# Patient Record
Sex: MALE | Race: WHITE | NOT HISPANIC OR LATINO | Employment: OTHER | URBAN - METROPOLITAN AREA
[De-identification: names, ages, dates, MRNs, and addresses within clinical notes are randomized per-mention and may not be internally consistent; named-entity substitution may affect disease eponyms.]

---

## 2017-04-12 ENCOUNTER — TELEPHONE (OUTPATIENT)
Dept: CARDIOLOGY | Facility: CLINIC | Age: 72
End: 2017-04-12

## 2017-04-12 NOTE — TELEPHONE ENCOUNTER
Yessenia called to see if pt is able to have an MRI preformed. Pt does have a Pacemaker.  Please advise   Yessenia call back # [phone]   Thanks Jaden wilson

## 2017-05-18 ENCOUNTER — HOSPITAL ENCOUNTER (OUTPATIENT)
Dept: CARDIOLOGY | Facility: HOSPITAL | Age: 72
Discharge: HOME | End: 2017-05-18
Admitting: INTERNAL MEDICINE

## 2017-05-18 ENCOUNTER — TELEPHONE (OUTPATIENT)
Dept: CARDIOLOGY | Facility: CLINIC | Age: 72
End: 2017-05-18

## 2017-05-18 VITALS
WEIGHT: 188 LBS | OXYGEN SATURATION: 98 % | SYSTOLIC BLOOD PRESSURE: 127 MMHG | DIASTOLIC BLOOD PRESSURE: 73 MMHG | BODY MASS INDEX: 26.92 KG/M2 | HEIGHT: 70 IN | RESPIRATION RATE: 16 BRPM | HEART RATE: 60 BPM

## 2017-05-18 DIAGNOSIS — I25.10 CORONARY ARTERY DISEASE INVOLVING NATIVE HEART, ANGINA PRESENCE UNSPECIFIED, UNSPECIFIED VESSEL OR LESION TYPE: ICD-10-CM

## 2017-05-18 DIAGNOSIS — Z95.0 STATUS POST PLACEMENT OF CARDIAC PACEMAKER: ICD-10-CM

## 2017-05-18 DIAGNOSIS — R07.9 CHEST PAIN, UNSPECIFIED TYPE: Primary | ICD-10-CM

## 2017-05-18 DIAGNOSIS — F17.210 CIGARETTE NICOTINE DEPENDENCE WITHOUT COMPLICATION: ICD-10-CM

## 2017-05-18 DIAGNOSIS — I49.5 SSS (SICK SINUS SYNDROME) (HCC): ICD-10-CM

## 2017-05-18 DIAGNOSIS — I10 ESSENTIAL HYPERTENSION: ICD-10-CM

## 2017-05-18 LAB
ALBUMIN SERPL-MCNC: 3.8 G/DL (ref 3.5–5.2)
ALBUMIN/GLOB SERPL: 1.3 G/DL
ALP SERPL-CCNC: 91 U/L (ref 39–117)
ALT SERPL W P-5'-P-CCNC: 6 U/L (ref 1–41)
ANION GAP SERPL CALCULATED.3IONS-SCNC: 12.5 MMOL/L
AST SERPL-CCNC: 19 U/L (ref 1–40)
BASOPHILS # BLD AUTO: 0.03 10*3/MM3 (ref 0–0.2)
BASOPHILS NFR BLD AUTO: 0.5 % (ref 0–1.5)
BILIRUB SERPL-MCNC: 0.3 MG/DL (ref 0.1–1.2)
BNP BLD-MCNC: 64 PG/ML
BUN BLD-MCNC: 16 MG/DL (ref 8–23)
BUN/CREAT SERPL: 11 (ref 7–25)
CALCIUM SPEC-SCNC: 9.3 MG/DL (ref 8.6–10.5)
CHLORIDE SERPL-SCNC: 99 MMOL/L (ref 98–107)
CK MB BLD-MCNC: 1 NG/ML
CO2 SERPL-SCNC: 27.5 MMOL/L (ref 22–29)
CREAT BLD-MCNC: 1.46 MG/DL (ref 0.76–1.27)
DEPRECATED D DIMER PPP-ACNC: 298
DEPRECATED RDW RBC AUTO: 50 FL (ref 37–54)
EOSINOPHIL # BLD AUTO: 0.17 10*3/MM3 (ref 0–0.7)
EOSINOPHIL NFR BLD AUTO: 2.6 % (ref 0.3–6.2)
ERYTHROCYTE [DISTWIDTH] IN BLOOD BY AUTOMATED COUNT: 15.6 % (ref 11.5–14.5)
GFR SERPL CREATININE-BSD FRML MDRD: 48 ML/MIN/1.73
GLOBULIN UR ELPH-MCNC: 3 GM/DL
GLUCOSE BLD-MCNC: 114 MG/DL (ref 65–99)
HCT VFR BLD AUTO: 38.5 % (ref 40.4–52.2)
HGB BLD-MCNC: 12.8 G/DL (ref 13.7–17.6)
IMM GRANULOCYTES # BLD: 0.03 10*3/MM3 (ref 0–0.03)
IMM GRANULOCYTES NFR BLD: 0.5 % (ref 0–0.5)
LYMPHOCYTES # BLD AUTO: 2.46 10*3/MM3 (ref 0.9–4.8)
LYMPHOCYTES NFR BLD AUTO: 37 % (ref 19.6–45.3)
MCH RBC QN AUTO: 28.9 PG (ref 27–32.7)
MCHC RBC AUTO-ENTMCNC: 33.2 G/DL (ref 32.6–36.4)
MCV RBC AUTO: 86.9 FL (ref 79.8–96.2)
MONOCYTES # BLD AUTO: 0.61 10*3/MM3 (ref 0.2–1.2)
MONOCYTES NFR BLD AUTO: 9.2 % (ref 5–12)
NEUTROPHILS # BLD AUTO: 3.35 10*3/MM3 (ref 1.9–8.1)
NEUTROPHILS NFR BLD AUTO: 50.2 % (ref 42.7–76)
PLATELET # BLD AUTO: 218 10*3/MM3 (ref 140–500)
PMV BLD AUTO: 8.8 FL (ref 6–12)
POC MYOGLOBIN: 94.4 NG/ML
POTASSIUM BLD-SCNC: 4.8 MMOL/L (ref 3.5–5.2)
PROT SERPL-MCNC: 6.8 G/DL (ref 6–8.5)
RBC # BLD AUTO: 4.43 10*6/MM3 (ref 4.6–6)
SODIUM BLD-SCNC: 139 MMOL/L (ref 136–145)
TROPONIN I SERPL-MCNC: 0.05 NG/ML (ref 0–0.6)
WBC NRBC COR # BLD: 6.65 10*3/MM3 (ref 4.5–10.7)

## 2017-05-18 PROCEDURE — 84484 ASSAY OF TROPONIN QUANT: CPT

## 2017-05-18 PROCEDURE — 85379 FIBRIN DEGRADATION QUANT: CPT

## 2017-05-18 PROCEDURE — 80053 COMPREHEN METABOLIC PANEL: CPT | Performed by: NURSE PRACTITIONER

## 2017-05-18 PROCEDURE — 36415 COLL VENOUS BLD VENIPUNCTURE: CPT | Performed by: NURSE PRACTITIONER

## 2017-05-18 PROCEDURE — 94760 N-INVAS EAR/PLS OXIMETRY 1: CPT | Performed by: NURSE PRACTITIONER

## 2017-05-18 PROCEDURE — 85025 COMPLETE CBC W/AUTO DIFF WBC: CPT | Performed by: NURSE PRACTITIONER

## 2017-05-18 PROCEDURE — 82553 CREATINE MB FRACTION: CPT

## 2017-05-18 PROCEDURE — 83880 ASSAY OF NATRIURETIC PEPTIDE: CPT

## 2017-05-18 PROCEDURE — 99215 OFFICE O/P EST HI 40 MIN: CPT | Performed by: NURSE PRACTITIONER

## 2017-05-18 PROCEDURE — 83874 ASSAY OF MYOGLOBIN: CPT

## 2017-05-18 RX ORDER — SODIUM CHLORIDE 0.9 % (FLUSH) 0.9 %
10 SYRINGE (ML) INJECTION AS NEEDED
Status: DISCONTINUED | OUTPATIENT
Start: 2017-05-18 | End: 2017-09-12

## 2017-05-18 RX ORDER — FLUOXETINE HYDROCHLORIDE 20 MG/1
20 CAPSULE ORAL NIGHTLY
COMMUNITY

## 2017-05-18 RX ORDER — NITROGLYCERIN 0.4 MG/1
0.4 TABLET SUBLINGUAL
Status: DISCONTINUED | OUTPATIENT
Start: 2017-05-18 | End: 2017-09-12

## 2017-05-18 RX ORDER — SIMVASTATIN 40 MG
40 TABLET ORAL NIGHTLY
COMMUNITY
End: 2017-09-12

## 2017-05-23 ENCOUNTER — HOSPITAL ENCOUNTER (OUTPATIENT)
Dept: CARDIOLOGY | Facility: HOSPITAL | Age: 72
Discharge: HOME | End: 2017-05-23
Admitting: NURSE PRACTITIONER

## 2017-05-23 ENCOUNTER — TELEPHONE (OUTPATIENT)
Dept: CARDIOLOGY | Facility: CLINIC | Age: 72
End: 2017-05-23

## 2017-05-23 DIAGNOSIS — R07.9 CHEST PAIN, UNSPECIFIED TYPE: ICD-10-CM

## 2017-05-23 DIAGNOSIS — I25.10 CORONARY ARTERY DISEASE INVOLVING NATIVE HEART, ANGINA PRESENCE UNSPECIFIED, UNSPECIFIED VESSEL OR LESION TYPE: ICD-10-CM

## 2017-05-23 DIAGNOSIS — I10 ESSENTIAL HYPERTENSION: ICD-10-CM

## 2017-05-23 LAB
BH CV STRESS BP STAGE 1: NORMAL
BH CV STRESS COMMENTS STAGE 1: NORMAL
BH CV STRESS DOSE REGADENOSON STAGE 1: 0.4
BH CV STRESS DURATION MIN STAGE 1: 0
BH CV STRESS DURATION SEC STAGE 1: 15
BH CV STRESS HR STAGE 1: 62
BH CV STRESS PROTOCOL 1: NORMAL
BH CV STRESS RECOVERY BP: NORMAL MMHG
BH CV STRESS RECOVERY HR: 61 BPM
BH CV STRESS STAGE 1: 1
LV EF NUC BP: 61 %
MAXIMAL PREDICTED HEART RATE: 149 BPM
PERCENT MAX PREDICTED HR: 41.61 %
STRESS BASELINE BP: NORMAL MMHG
STRESS BASELINE HR: 60 BPM
STRESS PERCENT HR: 49 %
STRESS POST EXERCISE DUR SEC: 15 SEC
STRESS POST PEAK BP: NORMAL MMHG
STRESS POST PEAK HR: 62 BPM
STRESS TARGET HR: 127 BPM

## 2017-05-23 PROCEDURE — 93017 CV STRESS TEST TRACING ONLY: CPT

## 2017-05-23 PROCEDURE — 0 TECHNETIUM TETROFOSMIN KIT: Performed by: NURSE PRACTITIONER

## 2017-05-23 PROCEDURE — A9502 TC99M TETROFOSMIN: HCPCS | Performed by: NURSE PRACTITIONER

## 2017-05-23 PROCEDURE — 78452 HT MUSCLE IMAGE SPECT MULT: CPT

## 2017-05-23 PROCEDURE — 93018 CV STRESS TEST I&R ONLY: CPT | Performed by: INTERNAL MEDICINE

## 2017-05-23 PROCEDURE — 25010000002 REGADENOSON 0.4 MG/5ML SOLUTION: Performed by: NURSE PRACTITIONER

## 2017-05-23 PROCEDURE — 78452 HT MUSCLE IMAGE SPECT MULT: CPT | Performed by: INTERNAL MEDICINE

## 2017-05-23 PROCEDURE — 93016 CV STRESS TEST SUPVJ ONLY: CPT | Performed by: INTERNAL MEDICINE

## 2017-05-23 RX ADMIN — TETROFOSMIN 1 DOSE: 1.38 INJECTION, POWDER, LYOPHILIZED, FOR SOLUTION INTRAVENOUS at 08:40

## 2017-05-23 RX ADMIN — REGADENOSON 0.4 MG: 0.08 INJECTION, SOLUTION INTRAVENOUS at 08:40

## 2017-05-23 RX ADMIN — TETROFOSMIN 1 DOSE: 1.38 INJECTION, POWDER, LYOPHILIZED, FOR SOLUTION INTRAVENOUS at 07:00

## 2017-05-24 ENCOUNTER — TRANSCRIBE ORDERS (OUTPATIENT)
Dept: ADMINISTRATIVE | Facility: HOSPITAL | Age: 72
End: 2017-05-24

## 2017-05-24 DIAGNOSIS — M54.12 CERVICAL RADICULITIS: Primary | ICD-10-CM

## 2017-05-30 ENCOUNTER — HOSPITAL ENCOUNTER (OUTPATIENT)
Dept: CT IMAGING | Facility: HOSPITAL | Age: 72
Discharge: HOME | End: 2017-05-30
Admitting: INTERNAL MEDICINE

## 2017-05-30 DIAGNOSIS — M54.12 CERVICAL RADICULITIS: ICD-10-CM

## 2017-05-30 PROCEDURE — 72125 CT NECK SPINE W/O DYE: CPT

## 2017-09-05 PROCEDURE — 99283 EMERGENCY DEPT VISIT LOW MDM: CPT

## 2017-09-05 RX ORDER — AMOXICILLIN 250 MG
2 CAPSULE ORAL DAILY
COMMUNITY

## 2017-09-05 RX ORDER — ASPIRIN 81 MG/1
81 TABLET ORAL DAILY
COMMUNITY

## 2017-09-05 RX ORDER — CLOPIDOGREL BISULFATE 75 MG/1
75 TABLET ORAL DAILY
COMMUNITY
Start: 2017-07-29

## 2017-09-05 RX ORDER — HYDROCODONE BITARTRATE AND ACETAMINOPHEN 7.5; 325 MG/1; MG/1
1 TABLET ORAL EVERY 6 HOURS PRN
COMMUNITY
End: 2019-09-10 | Stop reason: ALTCHOICE

## 2017-09-05 RX ORDER — TRAZODONE HYDROCHLORIDE 50 MG/1
25 TABLET ORAL NIGHTLY
COMMUNITY
Start: 2017-07-13

## 2017-09-05 RX ORDER — TAMSULOSIN HYDROCHLORIDE 0.4 MG/1
0.4 CAPSULE ORAL 2 TIMES DAILY
COMMUNITY
Start: 2017-07-13

## 2017-09-06 ENCOUNTER — HOSPITAL ENCOUNTER (EMERGENCY)
Facility: HOSPITAL | Age: 72
Discharge: HOME | End: 2017-09-06
Attending: EMERGENCY MEDICINE | Admitting: EMERGENCY MEDICINE

## 2017-09-06 VITALS
RESPIRATION RATE: 18 BRPM | HEIGHT: 70 IN | HEART RATE: 60 BPM | BODY MASS INDEX: 25.05 KG/M2 | DIASTOLIC BLOOD PRESSURE: 92 MMHG | SYSTOLIC BLOOD PRESSURE: 196 MMHG | WEIGHT: 175 LBS | TEMPERATURE: 98.7 F | OXYGEN SATURATION: 98 %

## 2017-09-06 DIAGNOSIS — I10 ESSENTIAL HYPERTENSION: Primary | ICD-10-CM

## 2017-09-06 RX ORDER — AMLODIPINE BESYLATE 5 MG/1
5 TABLET ORAL DAILY
Qty: 30 TABLET | Refills: 0 | Status: ON HOLD | OUTPATIENT
Start: 2017-09-06 | End: 2018-05-07 | Stop reason: ALTCHOICE

## 2017-09-06 NOTE — ED PROVIDER NOTES
EMERGENCY DEPARTMENT ENCOUNTER    CHIEF COMPLAINT  Chief Complaint: Hypertension  History given by: Patient pt's spouse  History limited by: nothing  Room Number:   PMD: Eilna Tucker MD      HPI:  Pt is a 71 y.o. male with a hx of stroke who presents complaining of hypertension noted at 176/82 at rehab today. Pt states that his BP is usually around 130/70, but has been running higher recently. Pt's spouse states that his BP medications were recently changed by Dr. Tucker (PCP). Pt's spouse states that his pulse usually runs around 60. Pt's spouse denies any recent hypotension. Pt also complains of mild headache and vomiting x1. Pt denies fever, CP, SOA, abdominal pain, diarrhea, and urinary sxs.    Duration/Onset/Timin day  Quality: hypertension  Intensity/Severity: moderate  Associated Symptoms: headache, vomiting  Aggravating or Alleviating Factors: none specified  Previous Episodes: Pt has a hx of hypertension      PAST MEDICAL HISTORY  Active Ambulatory Problems     Diagnosis Date Noted   • Myocardial infarction    • Hypertension    • CAD (coronary artery disease)    • SSS (sick sinus syndrome)      Resolved Ambulatory Problems     Diagnosis Date Noted   • No Resolved Ambulatory Problems     Past Medical History:   Diagnosis Date   • Allergic rhinitis    • Anemia    • Anxiety    • Artery stenosis 03/15/2011   • Artery stenosis 03/15/2011   • Arthritis    • Benign polyp of large intestine    • BPH (benign prostatic hypertrophy)    • Bronchitis 10/30/2013   • CAD (coronary artery disease)    • Carotid artery stenosis    • Cervical stenosis of spinal canal    • CKD (chronic kidney disease) stage 3, GFR 30-59 ml/min    • Colon polyp    • COPD (chronic obstructive pulmonary disease)    • CVA (cerebral infarction) 01/15/2014   • Depression    • Diabetic peripheral neuropathy    • Dizziness    • Fainting    • Fatigue    • Gastritis    • GERD (gastroesophageal reflux disease)    • Headache    • Hearing  loss    • Hematest positive stools 03/15/2012   • History of esophagogastroduodenoscopy 06/29/2011   • Hx of Doppler ultrasound 03/15/2011   • Hyperlipidemia    • Hypertension    • Lumbar canal stenosis    • Muscular aches    • Myocardial infarction    • Nausea 10/30/2013   • Nocturia    • Parkinson's disease    • Peptic ulcer    • Peripheral arterial disease    • Pulmonary nodule    • RLS (restless legs syndrome)    • Shortness of breath    • Skin cancer    • Sleep apnea    • SSS (sick sinus syndrome)    • Stroke 06/21/2017   • Type 2 diabetes mellitus    • Urinary hesitancy    • Vitamin D deficiency        PAST SURGICAL HISTORY  Past Surgical History:   Procedure Laterality Date   • BACK SURGERY      2005, 2008 C-SPINE, L-SPINE   • CATARACT EXTRACTION, BILATERAL  03/01/2009    WITH IOL   • CHOLECYSTECTOMY      2011 DYSKINESIA   • CORONARY ARTERY BYPASS GRAFT     • PACEMAKER IMPLANTATION      PERMANENT   • TEMPORAL ARTERY BIOPSY     • THROMBOENDARTERECTOMY  01/01/2008    CAROTID   • VEIN LIGATION AND STRIPPING     • VITRECTOMY PARS PLANA  09/15/2013    LOCAL BLOCK BY SURGEON LEFT       FAMILY HISTORY  Family History   Problem Relation Age of Onset   • Alzheimer's disease Mother    • Diabetes Mother    • Hypertension Mother    • Hypertension Father    • Heart attack Father    • Heart disease Father    • Hypertension Sister    • Heart disease Sister    • Diabetes Brother    • Heart disease Brother    • Hypertension Brother    • Stroke Brother        SOCIAL HISTORY  Social History     Social History   • Marital status:      Spouse name: N/A   • Number of children: N/A   • Years of education: N/A     Occupational History   • Not on file.     Social History Main Topics   • Smoking status: Former Smoker     Packs/day: 1.00     Types: Cigarettes     Quit date: 6/21/2017   • Smokeless tobacco: Never Used      Comment: Quit when he had a stroke in June 2017   • Alcohol use No   • Drug use: No   • Sexual activity:  Defer     Other Topics Concern   • Not on file     Social History Narrative   • No narrative on file       ALLERGIES  Review of patient's allergies indicates no known allergies.    REVIEW OF SYSTEMS  Review of Systems   Constitutional: Negative.  Negative for chills and fever.   HENT: Negative for sore throat.    Eyes: Negative.    Respiratory: Negative.  Negative for cough.    Cardiovascular: Negative.  Negative for chest pain.        Hypertension   Gastrointestinal: Positive for vomiting.   Genitourinary: Negative.  Negative for dysuria.   Musculoskeletal: Negative.  Negative for back pain.   Skin: Negative.  Negative for rash.   Neurological: Positive for headaches.       PHYSICAL EXAM  ED Triage Vitals   Temp Heart Rate Resp BP SpO2   09/05/17 2205 09/05/17 2203 09/05/17 2203 09/05/17 2241 09/05/17 2203   98.7 °F (37.1 °C) 61 18 210/96 97 %      Temp src Heart Rate Source Patient Position BP Location FiO2 (%)   09/05/17 2205 09/05/17 2203 09/05/17 2241 09/05/17 2241 --   Tympanic Monitor Sitting Right arm        Physical Exam   Constitutional: No distress.   HENT:   Head: Normocephalic and atraumatic.   Mouth/Throat: Oropharynx is clear and moist. Mucous membranes are dry (slightly).   Eyes:   Unremarkable   Cardiovascular: Normal rate and regular rhythm.    No murmur heard.  Pulmonary/Chest: Breath sounds normal. No respiratory distress.   Abdominal: There is no tenderness.   Musculoskeletal: He exhibits no edema or tenderness.   Neurological: He is alert.   Mild chronic R sided weakness   Skin: No rash noted.   Nursing note and vitals reviewed.      PROCEDURES  Procedures      PROGRESS AND CONSULTS  ED Course   0057  Discussed plan to discharge and add Amlodipine for hypertension. Instructed pt to check his BP twice a day for five days and report back to Dr. Tucker (PCP). Pt and family agree to plan and all questions are addressed.        MEDICAL DECISION MAKING  Results were reviewed/discussed with the patient  and they were also made aware of online access. Pt also made aware that some labs, such as cultures, will not be resulted during ER visit and follow up with PMD is necessary.     MDM  Number of Diagnoses or Management Options  Patient Progress  Patient progress: stable         DIAGNOSIS  Final diagnoses:   Essential hypertension       DISPOSITION  DISCHARGE    Patient discharged in stable condition.    Reviewed implications of results, diagnosis, meds, responsibility to follow up, warning signs and symptoms of possible worsening, potential complications and reasons to return to ER.    Patient/Family voiced understanding of above instructions.    Discussed plan for discharge, as there is no emergent indication for admission.  Pt/family is agreeable and understands need for follow up and repeat testing.  Pt is aware that discharge does not mean that nothing is wrong but it indicates no emergency is present that requires admission and they must continue care with follow-up as given below or physician of their choice.     FOLLOW-UP  Elina Tucker MD  [address]  [facility] [address]  [place] [id number]  [phone]      As planned         Medication List      New Prescriptions          amLODIPine 5 MG tablet   Commonly known as:  NORVASC   Take 1 tablet by mouth Daily.         Changed          aspirin 81 MG EC tablet   What changed:  Another medication with the same name was removed. Continue   taking this medication, and follow the directions you see here.         Stop          clonazePAM 0.5 MG tablet   Commonly known as:  KlonoPIN       metFORMIN 500 MG tablet   Commonly known as:  GLUCOPHAGE       simvastatin 40 MG tablet   Commonly known as:  ZOCOR       terazosin 10 MG capsule   Commonly known as:  HYTRIN       trihexyphenidyl 2 MG tablet   Commonly known as:  ARTANE               Latest Documented Vital Signs:  As of 1:02 AM  BP- (!) 196/92 HR- 60 Temp- 98.7 °F (37.1 °C) (Tympanic) O2 sat- 98%    --  Documentation  assistance provided by leticia Russ for Dr. Mosqueda.  Information recorded by the scribe was done at my direction and has been verified and validated by me.       Larissa Russ  09/06/17 0102       Trevin Mosqueda MD  09/06/17 0102

## 2017-09-06 NOTE — ED TRIAGE NOTES
Pt to triage from home. He ambulated to the triage room with steady gait and using his cane. He had outpatient rehab today (Pt had a CVA in June with residual right sided weakness) and his BP was high. He re-checked it tonight and it was even more elevated. Pt is on Metoprolol BID and Lisinopril BID (both doses taken twice today). He reports a slight headache but no other symptoms.

## 2017-09-07 ENCOUNTER — TRANSCRIBE ORDERS (OUTPATIENT)
Dept: ADMINISTRATIVE | Facility: HOSPITAL | Age: 72
End: 2017-09-07

## 2017-09-07 ENCOUNTER — OFFICE VISIT (OUTPATIENT)
Dept: ORTHOPEDIC SURGERY | Facility: CLINIC | Age: 72
End: 2017-09-07

## 2017-09-07 VITALS — TEMPERATURE: 98.5 F | BODY MASS INDEX: 25.62 KG/M2 | HEIGHT: 70 IN | WEIGHT: 179 LBS

## 2017-09-07 DIAGNOSIS — N28.9 RENAL INSUFFICIENCY: ICD-10-CM

## 2017-09-07 DIAGNOSIS — M25.511 CHRONIC RIGHT SHOULDER PAIN: Primary | ICD-10-CM

## 2017-09-07 DIAGNOSIS — I99.8 FLUCTUATING BLOOD PRESSURE: ICD-10-CM

## 2017-09-07 DIAGNOSIS — Z86.73 HISTORY OF STROKE: Primary | ICD-10-CM

## 2017-09-07 DIAGNOSIS — G89.29 CHRONIC RIGHT SHOULDER PAIN: Primary | ICD-10-CM

## 2017-09-07 PROCEDURE — 73030 X-RAY EXAM OF SHOULDER: CPT | Performed by: ORTHOPAEDIC SURGERY

## 2017-09-07 PROCEDURE — 20610 DRAIN/INJ JOINT/BURSA W/O US: CPT | Performed by: ORTHOPAEDIC SURGERY

## 2017-09-07 PROCEDURE — 99204 OFFICE O/P NEW MOD 45 MIN: CPT | Performed by: ORTHOPAEDIC SURGERY

## 2017-09-07 RX ORDER — METHYLPREDNISOLONE ACETATE 80 MG/ML
80 INJECTION, SUSPENSION INTRA-ARTICULAR; INTRALESIONAL; INTRAMUSCULAR; SOFT TISSUE
Status: COMPLETED | OUTPATIENT
Start: 2017-09-07 | End: 2017-09-07

## 2017-09-07 RX ORDER — BUPIVACAINE HYDROCHLORIDE 5 MG/ML
4 INJECTION, SOLUTION EPIDURAL; INTRACAUDAL
Status: COMPLETED | OUTPATIENT
Start: 2017-09-07 | End: 2017-09-07

## 2017-09-07 RX ADMIN — METHYLPREDNISOLONE ACETATE 80 MG: 80 INJECTION, SUSPENSION INTRA-ARTICULAR; INTRALESIONAL; INTRAMUSCULAR; SOFT TISSUE at 13:34

## 2017-09-07 RX ADMIN — BUPIVACAINE HYDROCHLORIDE 4 ML: 5 INJECTION, SOLUTION EPIDURAL; INTRACAUDAL at 13:34

## 2017-09-07 NOTE — PROGRESS NOTES
New Shoulder      Patient: Houston Armendariz        YOB: 1945    Medical Record Number: [id number]        Chief Complaints: SHOULDER PAIN right      History of Present Illness: This is a  71 y.o. male who presents with right shoulder pain.  He is right-hand-dominant is been ongoing since his stroke in the middle of the summer.  He did have falls before and after stroke more and states he had some injury to her shoulder.  He has seen a pain doctor in the past who injected his shoulder and his neck.  He states his shoulders quite limiting to his rehabilitation giving rise to significant night pain.  His   Symptoms are moderate to severe constant stabbing he is retired his past medical history is mild for diabetes sleep apnea stroke COPD cardiac disease and kidney disease and depression anxiety        Allergies: No Known Allergies    Medications:   Home Medications:  Current Outpatient Prescriptions on File Prior to Visit   Medication Sig   • amLODIPine (NORVASC) 5 MG tablet Take 1 tablet by mouth Daily.   • aspirin 325 MG tablet Take 325 mg by mouth daily.   • aspirin 81 MG EC tablet Take 81 mg by mouth Daily.   • budesonide-formoterol (SYMBICORT) 80-4.5 MCG/ACT inhaler Inhale 2 puffs 2 (two) times a day. RINSE MOUTH AFTER USE   • carbidopa-levodopa (SINEMET)  MG per tablet Take 2 tablets by mouth 3 (three) times a day.   • clonazePAM (KlonoPIN) 0.5 MG tablet Take 0.5 mg by mouth 3 (Three) Times a Day.   • clopidogrel (PLAVIX) 75 MG tablet Take 75 mg by mouth Daily.   • cyanocobalamin (VITAMIN B-12) 1000 MCG tablet Take 100 mcg by mouth Daily.   • FLUoxetine (PROzac) 20 MG capsule Take 20 mg by mouth Every Night.   • FLUoxetine (PROzac) 40 MG capsule Take 40 mg by mouth Every Morning.   • fluticasone (VERAMYST) 27.5 MCG/SPRAY nasal spray 2 sprays into each nostril daily.   • gabapentin (NEURONTIN) 600 MG tablet Take 600 mg by mouth. 300 mg in the morning, 1200mg tablets nightly.   •  HYDROcodone-acetaminophen (NORCO) 7.5-325 MG per tablet Take 1 tablet by mouth.   • lisinopril (PRINIVIL,ZESTRIL) 40 MG tablet TAKE ONE TABLET BY MOUTH TWICE A DAY   • memantine (NAMENDA XR) 28 MG capsule sustained-release 24 hr extended release capsule Take 28 mg by mouth daily.   • metFORMIN (GLUCOPHAGE) 500 MG tablet Take 500 mg by mouth 2 (two) times a day with meals.   • metoprolol tartrate (LOPRESSOR) 25 MG tablet Take 25 mg by mouth 2 (two) times a day.   • nitroglycerin (NITROSTAT) 0.4 MG SL tablet Place 0.4 mg under the tongue every 5 (five) minutes as needed for chest pain. Take no more than 3 doses in 15 minutes. IF PAIN REMAINS AFTER 5 MINUTES, CALL 911 AND REPEAT DOSE   • Pantoprazole Sodium 40 MG pack Take 40 mg by mouth 2 (two) times a day.   • POLYETHYLENE GLYCOL EX Daily.   • rivastigmine (EXELON) 4.5 MG capsule Take 4.5 mg by mouth 2 (two) times a day.   • senna-docusate (PERICOLACE) 8.6-50 MG per tablet Take 2 tablets by mouth Daily.   • simvastatin (ZOCOR) 40 MG tablet Take 40 mg by mouth Every Night.   • tamsulosin (FLOMAX) 0.4 MG capsule 24 hr capsule Take 0.4 mg by mouth Every Night.   • terazosin (HYTRIN) 10 MG capsule Take 10 mg by mouth.   • traZODone (DESYREL) 50 MG tablet Take 25 mg by mouth Every Night.   • trihexyphenidyl (ARTANE) 2 MG tablet Take 2 mg by mouth 2 (Two) Times a Day With Meals. TAKE ONE TABLET DAILY AS DIRECTED      Current Facility-Administered Medications on File Prior to Visit   Medication   • nitroglycerin (NITROSTAT) SL tablet 0.4 mg   • sodium chloride 0.9 % flush 10 mL     Current Medications:  Scheduled Meds:   Continuous Infusions:   PRN Meds:.•  nitroglycerin  •  sodium chloride    Past Medical History:   Diagnosis Date   • Allergic rhinitis    • Anemia    • Anxiety    • Artery stenosis 03/15/2011    INFERIOR MESENTERIC   • Artery stenosis 03/15/2011    SUPERIOR MESENTERIC    • Arthritis    • Benign polyp of large intestine    • BPH (benign prostatic  hypertrophy)     Dr. Peter.   • Bronchitis 10/30/2013   • CAD (coronary artery disease)     prior MI, status post 3V CABG 2008   • Carotid artery stenosis     Hx of left CEA in 2007   • Cervical stenosis of spinal canal     DDD and OA   • CKD (chronic kidney disease) stage 3, GFR 30-59 ml/min    • Colon polyp    • COPD (chronic obstructive pulmonary disease)    • CVA (cerebral infarction) 01/15/2014    OLD LEFT SUPERIOR PARIETAL INFARCT PER CT OF BRAIN   • Depression    • Diabetic peripheral neuropathy    • Dizziness    • Fainting    • Fatigue    • Gastritis    • GERD (gastroesophageal reflux disease)    • Headache    • Hearing loss     left   • Hematest positive stools 03/15/2012    NEGATIVE X 3   • History of esophagogastroduodenoscopy 06/29/2011   • Hx of Doppler ultrasound 03/15/2011    ARTERY CAROTID 3-2011 <50%stenosis brielle hx of left cea.....09-11 <50%STENOSIS ON RIGHT, NORMAL APPEARINT LEFT S/P ENDARTERECTOMY   • Hyperlipidemia    • Hypertension    • Lumbar canal stenosis     due to DDD and OA   • Muscular aches    • Myocardial infarction    • Nausea 10/30/2013   • Nocturia    • Parkinson's disease    • Peptic ulcer    • Peripheral arterial disease    • Pulmonary nodule     12/13 CT with RUL (new) and smaller RLL nodules (followed by Dr Reynoso), repeat CT 6 months                                    6/13 4mm RLL pulmonary nodule per CT, recheck 6 months   • RLS (restless legs syndrome)    • Shortness of breath    • Skin cancer    • Sleep apnea     on BIPAP   • SSS (sick sinus syndrome)    • Stroke 06/21/2017   • Type 2 diabetes mellitus    • Urinary hesitancy    • Vitamin D deficiency         Past Surgical History:   Procedure Laterality Date   • BACK SURGERY      2005, 2008 C-SPINE, L-SPINE   • CATARACT EXTRACTION, BILATERAL  03/01/2009    WITH IOL   • CHOLECYSTECTOMY      2011 DYSKINESIA   • CORONARY ARTERY BYPASS GRAFT     • PACEMAKER IMPLANTATION      PERMANENT   • TEMPORAL ARTERY BIOPSY     •  THROMBOENDARTERECTOMY  01/01/2008    CAROTID   • VEIN LIGATION AND STRIPPING     • VITRECTOMY PARS PLANA  09/15/2013    LOCAL BLOCK BY SURGEON LEFT        Social History     Occupational History   • Not on file.     Social History Main Topics   • Smoking status: Former Smoker     Packs/day: 1.00     Types: Cigarettes     Quit date: 6/21/2017   • Smokeless tobacco: Never Used      Comment: Quit when he had a stroke in June 2017   • Alcohol use No   • Drug use: No   • Sexual activity: Defer      Social History     Social History Narrative   • No narrative on file        Family History   Problem Relation Age of Onset   • Alzheimer's disease Mother    • Diabetes Mother    • Hypertension Mother    • Hypertension Father    • Heart attack Father    • Heart disease Father    • Hypertension Sister    • Heart disease Sister    • Diabetes Brother    • Heart disease Brother    • Hypertension Brother    • Stroke Brother              Review of Systems: 14 point review of systems are remarkable for the pertinent positives listed in the chart by the patient the remainder are negative    Review of Systems      Physical Exam: 71 y.o. male  General Appearance:    Alert, cooperative, in no acute distress                 There were no vitals filed for this visit.   Patient is alert and read ×3 no acute distress appears her above-listed at height weight and age.  Affect is normal respiratory rate is normal unlabored. Heart rate regular rate rhythm, sclera, dentition and hearing are normal for the purpose of this exam.    Ortho Exam Physical exam of the right shoulder reveals no overlying skin changes no lymphedema no lymphadenopathy.  Patient has active flexion 180 and significant limping with mild symptoms abduction is similar external rotation is to 40 and internal rotation to the upper lumbar spine with mild symptoms.  Patient has good rotator cuff strength 4 over 5 with isometric strength testing with pain.  Patient has a positive  impingement and a positive Ferrer sign.  Patient has good cervical range of motion which is full and asymptomatic no radicular symptoms.  Patient has a normal elbow exam.  Good distal pulses are present he does have some stiffness and is hand and his fingers  Patient has pain with overhead activity and a positive Neer sign and a positive empty can sign    Large Joint Arthrocentesis  Date/Time: 9/7/2017 1:34 PM  Consent given by: patient  Site marked: site marked  Timeout: Immediately prior to procedure a time out was called to verify the correct patient, procedure, equipment, support staff and site/side marked as required   Supporting Documentation  Indications: pain   Procedure Details  Location: shoulder - R subacromial bursa  Preparation: Patient was prepped and draped in the usual sterile fashion  Needle size: 25 G  Approach: posterior  Medications administered: 80 mg methylPREDNISolone acetate 80 MG/ML; 4 mL bupivacaine (PF) 0.5 %  Patient tolerance: patient tolerated the procedure well with no immediate complications                Radiology:   AP, Scapular Y and Axillary Lateral of the right shoulder were ordered/reviewed to evauate shoulder pain.  I've no comparative films he has some mild degenerative changes at the glenohumeral joint as best seen on the glenoid side on the A-P no acute bony pathology is seen    Assessment/Plan:  Right shoulder pain following several falls and a CVA he most likely has an underlying rotator cuff tear.  My goal is to get him some relief so shoulder does not inhibit his rehabilitation.  I would like to inject him today he was agreeable to do this we could repeat it in 3 months if it gives him relief.  Hopefully this give him relief especially from his night pain

## 2017-09-11 ENCOUNTER — HOSPITAL ENCOUNTER (OUTPATIENT)
Dept: CARDIOLOGY | Facility: HOSPITAL | Age: 72
Discharge: HOME | End: 2017-09-11
Admitting: INTERNAL MEDICINE

## 2017-09-11 DIAGNOSIS — N28.9 RENAL INSUFFICIENCY: ICD-10-CM

## 2017-09-11 DIAGNOSIS — I99.8 FLUCTUATING BLOOD PRESSURE: ICD-10-CM

## 2017-09-11 DIAGNOSIS — Z86.73 HISTORY OF STROKE: ICD-10-CM

## 2017-09-11 LAB
BH CV ECHO MEAS - DIST REN A EDV LEFT: 21.1 CM/SEC
BH CV ECHO MEAS - DIST REN A PSV LEFT: 72 CM/SEC
BH CV ECHO MEAS - DIST REN A RI LEFT: 0.71
BH CV ECHO MEAS - HILAR A EDV LEFT: 10.8 CM/SEC
BH CV ECHO MEAS - HILAR A PSV LEFT: 50.2 CM/SEC
BH CV ECHO MEAS - HILAR A RI LEFT: 0.78
BH CV ECHO MEAS - MID REN A EDV LEFT: 18 CM/SEC
BH CV ECHO MEAS - MID REN A PSV LEFT: 90 CM/SEC
BH CV ECHO MEAS - MID REN A RI LEFT: 0.8
BH CV ECHO MEAS - PROX REN A EDV LEFT: 17.4 CM/SEC
BH CV ECHO MEAS - PROX REN A PSV LEFT: 87.1 CM/SEC
BH CV ECHO MEAS - PROX REN A RI LEFT: 0.8
BH CV VAS BP LEFT ARM: NORMAL MMHG
BH CV VAS BP RIGHT ARM: NORMAL MMHG
BH CV VAS RENAL AORTIC MID PSV: 50 CM/S
BH CV VAS RENAL HILUM LEFT EDV: 10.8 CM/S
BH CV VAS RENAL HILUM LEFT PSV: 50.2 CM/S
BH CV VAS RENAL HILUM RIGHT EDV: 11.8 CM/S
BH CV VAS RENAL HILUM RIGHT PSV: 38.4 CM/S
BH CV XLRA MEAS - KID L LEFT: 10.5 CM
BH CV XLRA MEAS - RENAL A ORG RI LEFT: 0.8
BH CV XLRA MEAS DIST REN A EDV RIGHT: 15.7 CM/SEC
BH CV XLRA MEAS DIST REN A PSV RIGHT: 77.8 CM/SEC
BH CV XLRA MEAS DIST REN A RI RIGHT: 0.8
BH CV XLRA MEAS HILAR A EDV RIGHT: 11.8 CM/SEC
BH CV XLRA MEAS HILAR A PSV RIGHT: 38.4 CM/SEC
BH CV XLRA MEAS HILAR A RI RIGHT: 0.69
BH CV XLRA MEAS KID L RIGHT: 10.3 CM
BH CV XLRA MEAS KID W RIGHT: 5.11 CM
BH CV XLRA MEAS MID REN A EDV RIGHT: -17.3 CM/SEC
BH CV XLRA MEAS MID REN A PSV RIGHT: -90.4 CM/SEC
BH CV XLRA MEAS MID REN A RI RIGHT: 0.81
BH CV XLRA MEAS PROX REN A EDV RIGHT: 24.6 CM/SEC
BH CV XLRA MEAS PROX REN A PSV RIGHT: 100 CM/SEC
BH CV XLRA MEAS PROX REN A RI RIGHT: 0.75
BH CV XLRA MEAS RAR LEFT: 1.8
BH CV XLRA MEAS RAR RIGHT: 2
BH CV XLRA MEAS RENAL A ORG EDV LEFT: 17 CM/SEC
BH CV XLRA MEAS RENAL A ORG EDV RIGHT: 16.7 CM/SEC
BH CV XLRA MEAS RENAL A ORG PSV LEFT: 83 CM/SEC
BH CV XLRA MEAS RENAL A ORG PSV RIGHT: 70.9 CM/SEC
BH CV XLRA MEAS RENAL A ORG RI RIGHT: 0.76
LEFT KIDNEY WIDTH: 10.77 CM
LEFT RENAL UPPER PARENCHYMA MAX: 37.8 CM/S
LEFT RENAL UPPER PARENCHYMA MIN: 8.3 CM/S
LEFT RENAL UPPER PARENCHYMA RI: 0.78
RIGHT RENAL UPPER PARENCHYMA MAX: 29.7 CM/S
RIGHT RENAL UPPER PARENCHYMA MIN: 6.8 CM/S
RIGHT RENAL UPPER PARENCHYMA RI: 0.77

## 2017-09-11 PROCEDURE — 93975 VASCULAR STUDY: CPT

## 2017-09-20 ENCOUNTER — OFFICE VISIT (OUTPATIENT)
Dept: CARDIOLOGY | Facility: CLINIC | Age: 72
End: 2017-09-20

## 2017-09-20 ENCOUNTER — CLINICAL SUPPORT NO REQUIREMENTS (OUTPATIENT)
Dept: CARDIOLOGY | Facility: CLINIC | Age: 72
End: 2017-09-20

## 2017-09-20 VITALS
DIASTOLIC BLOOD PRESSURE: 70 MMHG | SYSTOLIC BLOOD PRESSURE: 138 MMHG | BODY MASS INDEX: 25.62 KG/M2 | HEIGHT: 70 IN | WEIGHT: 179 LBS | HEART RATE: 63 BPM

## 2017-09-20 DIAGNOSIS — R01.1 MURMUR: ICD-10-CM

## 2017-09-20 DIAGNOSIS — I10 ESSENTIAL HYPERTENSION: Primary | ICD-10-CM

## 2017-09-20 DIAGNOSIS — I49.5 SICK SINUS SYNDROME (HCC): Primary | ICD-10-CM

## 2017-09-20 DIAGNOSIS — I63.9 CEREBROVASCULAR ACCIDENT (CVA), UNSPECIFIED MECHANISM (HCC): ICD-10-CM

## 2017-09-20 DIAGNOSIS — I49.5 SSS (SICK SINUS SYNDROME) (HCC): ICD-10-CM

## 2017-09-20 DIAGNOSIS — I25.10 CORONARY ARTERY DISEASE INVOLVING NATIVE CORONARY ARTERY OF NATIVE HEART WITHOUT ANGINA PECTORIS: ICD-10-CM

## 2017-09-20 PROCEDURE — 93000 ELECTROCARDIOGRAM COMPLETE: CPT | Performed by: NURSE PRACTITIONER

## 2017-09-20 PROCEDURE — 93280 PM DEVICE PROGR EVAL DUAL: CPT | Performed by: INTERNAL MEDICINE

## 2017-09-20 PROCEDURE — 99214 OFFICE O/P EST MOD 30 MIN: CPT | Performed by: NURSE PRACTITIONER

## 2017-09-20 RX ORDER — METOPROLOL TARTRATE 50 MG/1
50 TABLET, FILM COATED ORAL 2 TIMES DAILY
Qty: 60 TABLET | Refills: 3 | Status: SHIPPED | OUTPATIENT
Start: 2017-09-20 | End: 2018-01-23 | Stop reason: SDUPTHER

## 2017-09-20 RX ORDER — LISINOPRIL 10 MG/1
10 TABLET ORAL 2 TIMES DAILY
COMMUNITY
End: 2020-10-29 | Stop reason: ALTCHOICE

## 2017-09-20 NOTE — PROGRESS NOTES
Date of Office Visit: 2017  Encounter Provider: IRENA Mayorga  Place of Service: [facility] CARDIOLOGY  Patient Name: Houston Armendariz  :1945    Chief Complaint   Patient presents with   • Hypertension   :     HPI: Houston Armendariz is a 72 y.o. male comes in today for hypertension and a recent stroke.  He is a patient of Dr. Juan.  I'm seeing him for the first time today and have reviewed his records.  He has a history of coronary disease status post coronary artery bypass graft, hypertension, sick sinus syndrome with pacemaker placement, sleep apnea, COPD , diabetes and CVA.    May 2016, patient had a myocardial perfusion study showing no evidence of ischemia and EF of 56%.    May 2017, patient was in his primary care office complaining of chest pain.  He took 3 nitroglycerin which relieved his pain.  He was sent to the chest pain unit and seen by IRENA Gonzalez.  He ruled out for MI.  Set up for repeat stress study showing no evidence of ischemia.  He was to see Dr. Juan in 2017 but did not show up for his appointment.    On 2017, the patient went to the emergency room complaining of hypertension of 176/82 at rehabilitation.  He noted his blood pressure was typically 130/70.  His blood pressure medicine and recently been changed by his primary care physician.  Amlodipine was added to his medications for hypertension.    Today the patient comes in because of his blood pressure. He states he was in the hospital in 2017 with a stroke at [facility].   He also went to [facility]. He was going to physical therapy as an outpatient and the blood pressure was getting elevated at 170s/90s. They were concerned about continuing physical therapy and he had to stop; that is why he went to the emergency room. He reports today that his blood pressure has been better controlled over the past couple of days, but it has taken a while since being on  amlodipine. He denies any other symptoms.  He denies palpitations or tachycardia, shortness of breath, edema, dizziness, chest pain, orthopnea or PND. He is following up with a vascular physician due to his stroke and a history of carotid endarterectomy. They are re-ultrasounding his carotid arteries.           Past Medical History:   Diagnosis Date   • Allergic rhinitis    • Anemia    • Anxiety    • Artery stenosis 03/15/2011    INFERIOR MESENTERIC   • Artery stenosis 03/15/2011    SUPERIOR MESENTERIC    • Arthritis    • Benign polyp of large intestine    • BPH (benign prostatic hypertrophy)     Dr. Peter.   • Bronchitis 10/30/2013   • CAD (coronary artery disease)     prior MI, status post 3V CABG 2008   • Carotid artery stenosis     Hx of left CEA in 2007   • Cervical stenosis of spinal canal     DDD and OA   • CKD (chronic kidney disease) stage 3, GFR 30-59 ml/min    • Colon polyp    • COPD (chronic obstructive pulmonary disease)    • CVA (cerebral infarction) 01/15/2014    OLD LEFT SUPERIOR PARIETAL INFARCT PER CT OF BRAIN   • Depression    • Diabetic peripheral neuropathy    • Dizziness    • Fainting    • Fatigue    • Gastritis    • GERD (gastroesophageal reflux disease)    • Headache    • Hearing loss     left   • Hematest positive stools 03/15/2012    NEGATIVE X 3   • History of esophagogastroduodenoscopy 06/29/2011   • Hx of Doppler ultrasound 03/15/2011    ARTERY CAROTID 3-2011 <50%stenosis brielle hx of left cea.....09-11 <50%STENOSIS ON RIGHT, NORMAL APPEARINT LEFT S/P ENDARTERECTOMY   • Hyperlipidemia    • Hypertension    • Lumbar canal stenosis     due to DDD and OA   • Muscular aches    • Myocardial infarction    • Nausea 10/30/2013   • Nocturia    • Parkinson's disease    • Peptic ulcer    • Peripheral arterial disease    • Pulmonary nodule     12/13 CT with RUL (new) and smaller RLL nodules (followed by Dr Reynoso), repeat CT 6 months                                    6/13 4mm RLL pulmonary nodule per  CT, recheck 6 months   • RLS (restless legs syndrome)    • Shortness of breath    • Skin cancer    • Sleep apnea     on BIPAP   • SSS (sick sinus syndrome)    • Stroke 06/21/2017   • Type 2 diabetes mellitus    • Urinary hesitancy    • Vitamin D deficiency        Past Surgical History:   Procedure Laterality Date   • BACK SURGERY      2005, 2008 C-SPINE, L-SPINE   • CATARACT EXTRACTION, BILATERAL  03/01/2009    WITH IOL   • CHOLECYSTECTOMY      2011 DYSKINESIA   • CORONARY ARTERY BYPASS GRAFT     • PACEMAKER IMPLANTATION      PERMANENT   • TEMPORAL ARTERY BIOPSY     • THROMBOENDARTERECTOMY  01/01/2008    CAROTID   • VEIN LIGATION AND STRIPPING     • VITRECTOMY PARS PLANA  09/15/2013    LOCAL BLOCK BY SURGEON LEFT           Review of Systems   Constitution: Negative for fever and malaise/fatigue.   HENT: Negative for ear pain, hearing loss, nosebleeds and sore throat.    Eyes: Negative for double vision, pain, vision loss in left eye, vision loss in right eye and visual disturbance.   Cardiovascular: Negative for claudication and leg swelling.   Respiratory: Negative for cough, snoring and wheezing.    Endocrine: Negative for cold intolerance, heat intolerance and polyuria.   Skin: Negative for color change, itching and rash.   Musculoskeletal: Negative for joint pain, joint swelling and muscle cramps.   Gastrointestinal: Negative for abdominal pain, diarrhea, melena, nausea and vomiting.   Genitourinary: Negative for bladder incontinence and hematuria.   Neurological: Negative for excessive daytime sleepiness, dizziness, light-headedness, paresthesias and seizures.   Psychiatric/Behavioral: Negative for depression. The patient is not nervous/anxious.    All other systems reviewed and are negative.    All other systems reviewed and are negative    No Known Allergies    All aspects of family and social history reviewed.          Objective:     Vitals:    09/20/17 0820   BP: 138/70   BP Location: Left arm   Pulse: 63  "  Weight: 179 lb (81.2 kg)   Height: 70\" (177.8 cm)     Body mass index is 25.68 kg/(m^2).    PHYSICAL EXAM:  Physical Exam   Constitutional: He is oriented to person, place, and time. He appears well-developed and well-nourished.   HENT:   Head: Normocephalic and atraumatic.   Neck: Neck supple. No JVD present.   Cardiovascular: Normal rate, regular rhythm and intact distal pulses.    Murmur heard.   Harsh midsystolic murmur is present with a grade of 2/6  at the upper right sternal border radiating to the neck  Pulses:       Carotid pulses are 2+ on the right side, and 2+ on the left side.       Radial pulses are 2+ on the right side, and 2+ on the left side.        Dorsalis pedis pulses are 2+ on the right side, and 2+ on the left side.   Pulmonary/Chest: Effort normal and breath sounds normal. No accessory muscle usage. No respiratory distress. He has no rales.   Abdominal: Soft. Normal appearance and bowel sounds are normal. There is no tenderness.   Musculoskeletal: Normal range of motion. He exhibits no edema.   Neurological: He is alert and oriented to person, place, and time.   Skin: Skin is warm, dry and intact. He is not diaphoretic.   Psychiatric: He has a normal mood and affect. His speech is normal and behavior is normal. Judgment and thought content normal. Cognition and memory are normal.         ECG 12 Lead  Date/Time: 9/20/2017 8:56 AM  Performed by: JINNY JAMIL  Authorized by: JINNY JAMIL   Comparison: compared with previous ECG   Rhythm: sinus rhythm  Rate: normal  BPM: 61  Conduction: conduction normal  ST Segments: ST segments normal  T Waves: T waves normal  QRS axis: normal  Other: no other findings  Clinical impression: normal ECG  Comments: Indication: SSS, HTN                Assessment:       Diagnosis Plan   1. Essential hypertension  ECG 12 Lead   2. Coronary artery disease involving native coronary artery of native heart without angina pectoris  ECG 12 Lead   3. SSS (sick " sinus syndrome)  ECG 12 Lead   4. Murmur  ECG 12 Lead   5. Cerebrovascular accident (CVA), unspecified mechanism          Orders Placed This Encounter   Procedures   • ECG 12 Lead     This order was created via procedure documentation       Current Outpatient Prescriptions   Medication Sig Dispense Refill   • amLODIPine (NORVASC) 5 MG tablet Take 1 tablet by mouth Daily. 30 tablet 0   • aspirin 81 MG EC tablet Take 81 mg by mouth Daily.     • budesonide-formoterol (SYMBICORT) 80-4.5 MCG/ACT inhaler Inhale 2 puffs 2 (two) times a day. RINSE MOUTH AFTER USE     • carbidopa-levodopa (SINEMET)  MG per tablet Take 2 tablets by mouth 3 (three) times a day.     • clopidogrel (PLAVIX) 75 MG tablet Take 75 mg by mouth Daily.     • cyanocobalamin (VITAMIN B-12) 1000 MCG tablet Take 100 mcg by mouth Daily.     • FLUoxetine (PROzac) 20 MG capsule Take 20 mg by mouth Every Night.     • FLUoxetine (PROzac) 40 MG capsule Take 40 mg by mouth Every Morning.     • fluticasone (VERAMYST) 27.5 MCG/SPRAY nasal spray 2 sprays into each nostril daily.     • gabapentin (NEURONTIN) 600 MG tablet Take 600 mg by mouth. 300 mg in the morning, 1200mg tablets nightly.     • HYDROcodone-acetaminophen (NORCO) 7.5-325 MG per tablet Take 1 tablet by mouth.     • lisinopril (PRINIVIL,ZESTRIL) 10 MG tablet Take 10 mg by mouth 2 (Two) Times a Day.     • memantine (NAMENDA XR) 28 MG capsule sustained-release 24 hr extended release capsule Take 28 mg by mouth daily.     • metoprolol tartrate (LOPRESSOR) 50 MG tablet Take 1 tablet by mouth 2 (Two) Times a Day. 60 tablet 3   • nitroglycerin (NITROSTAT) 0.4 MG SL tablet Place 0.4 mg under the tongue every 5 (five) minutes as needed for chest pain. Take no more than 3 doses in 15 minutes. IF PAIN REMAINS AFTER 5 MINUTES, CALL 911 AND REPEAT DOSE     • Pantoprazole Sodium 40 MG pack Take 40 mg by mouth 2 (two) times a day.     • POLYETHYLENE GLYCOL EX Daily.     • rivastigmine (EXELON) 4.5 MG capsule  Take 4.5 mg by mouth 2 (two) times a day.     • senna-docusate (PERICOLACE) 8.6-50 MG per tablet Take 2 tablets by mouth Daily.     • tamsulosin (FLOMAX) 0.4 MG capsule 24 hr capsule Take 0.4 mg by mouth Every Night.     • traZODone (DESYREL) 50 MG tablet Take 25 mg by mouth Every Night.       No current facility-administered medications for this visit.             Plan:         1. Hypertension.  The patient is having issues with his blood pressure.  It is better since adding amlodipine 5 mg daily.  His blood pressure was still elevated yesterday.  Creatinine was slightly elevated in 05/2017.  I will not increase his lisinopril.  We will increase metoprolol to 50 mg twice a day.  The patient has a pacemaker in place.    2. Coronary disease.  History of coronary disease status post coronary artery bypass grafting without angina.    3. Sick sinus syndrome with pacemaker in place.  He has not had the pacemaker checked in over a year.  We will get this checked today.    4. Murmur.  No history of a documented murmur.  I hear a grade 2 systolic murmur over the aortic valve listening area.  We will review records from [facility] for an echocardiogram.  If no echocardiogram was performed, we will order.    5. Stroke.  History of a stroke with a repeat in 06/2017.  This was at [facility] with rehabilitation at [facility].  He is currently on aspirin and Plavix.  He sees vascular.  Per the patient’s report, this seems like an atherosclerotic stroke.  He has no history of atrial fibrillation.      We will get records to review.  Follow up with primary care physician in about one month for check of blood pressure.  Follow up with Dr. Juan in three months.            Follow up in office in 3 months with Dr. Juan    As always, it has been a pleasure to participate in this patient's care.      Sincerely,      IRENA Mayorga

## 2017-09-21 ENCOUNTER — DOCUMENTATION (OUTPATIENT)
Dept: CARDIOLOGY | Facility: CLINIC | Age: 72
End: 2017-09-21

## 2017-09-21 NOTE — PROGRESS NOTES
Reviewed echocardiogram from [facility].  EF 54%.  Right ventricle normal size and function.  Grade 1 diastolic dysfunction, mild to moderate right atrial enlargement.  Moderate mitral calcification without stenosis.  Trace mitral regurgitation.  Mild tricuspid regurgitation.  Moderately calcified aortic valve, moderate restriction of aortic valve opening no significant stenosis.  No aortic regurgitation.

## 2017-10-04 ENCOUNTER — TELEPHONE (OUTPATIENT)
Dept: CARDIOLOGY | Facility: HOSPITAL | Age: 72
End: 2017-10-04

## 2017-10-17 ENCOUNTER — TRANSCRIBE ORDERS (OUTPATIENT)
Dept: ADMINISTRATIVE | Facility: HOSPITAL | Age: 72
End: 2017-10-17

## 2017-10-17 DIAGNOSIS — M79.652 PAIN IN BOTH THIGHS: ICD-10-CM

## 2017-10-17 DIAGNOSIS — M54.50 NONSPECIFIC PAIN IN THE LUMBAR REGION: Primary | ICD-10-CM

## 2017-10-17 DIAGNOSIS — M79.651 PAIN IN BOTH THIGHS: ICD-10-CM

## 2017-10-18 ENCOUNTER — HOSPITAL ENCOUNTER (OUTPATIENT)
Dept: CT IMAGING | Facility: HOSPITAL | Age: 72
Discharge: HOME | End: 2017-10-18
Admitting: INTERNAL MEDICINE

## 2017-10-18 DIAGNOSIS — M79.651 PAIN IN BOTH THIGHS: ICD-10-CM

## 2017-10-18 DIAGNOSIS — M79.652 PAIN IN BOTH THIGHS: ICD-10-CM

## 2017-10-18 DIAGNOSIS — M54.50 NONSPECIFIC PAIN IN THE LUMBAR REGION: ICD-10-CM

## 2017-10-18 PROCEDURE — 72131 CT LUMBAR SPINE W/O DYE: CPT

## 2017-12-05 ENCOUNTER — OFFICE VISIT (OUTPATIENT)
Dept: CARDIOLOGY | Facility: CLINIC | Age: 72
End: 2017-12-05

## 2017-12-05 ENCOUNTER — CLINICAL SUPPORT NO REQUIREMENTS (OUTPATIENT)
Dept: CARDIOLOGY | Facility: CLINIC | Age: 72
End: 2017-12-05

## 2017-12-05 VITALS
HEART RATE: 60 BPM | SYSTOLIC BLOOD PRESSURE: 150 MMHG | DIASTOLIC BLOOD PRESSURE: 94 MMHG | WEIGHT: 187 LBS | BODY MASS INDEX: 26.77 KG/M2 | HEIGHT: 70 IN | OXYGEN SATURATION: 95 %

## 2017-12-05 DIAGNOSIS — I10 ESSENTIAL HYPERTENSION: ICD-10-CM

## 2017-12-05 DIAGNOSIS — I49.5 SICK SINUS SYNDROME (HCC): Primary | ICD-10-CM

## 2017-12-05 DIAGNOSIS — I49.5 SSS (SICK SINUS SYNDROME) (HCC): ICD-10-CM

## 2017-12-05 DIAGNOSIS — I25.10 CORONARY ARTERY DISEASE INVOLVING NATIVE CORONARY ARTERY OF NATIVE HEART WITHOUT ANGINA PECTORIS: Primary | ICD-10-CM

## 2017-12-05 PROCEDURE — 93296 REM INTERROG EVL PM/IDS: CPT | Performed by: INTERNAL MEDICINE

## 2017-12-05 PROCEDURE — 99214 OFFICE O/P EST MOD 30 MIN: CPT | Performed by: INTERNAL MEDICINE

## 2017-12-05 PROCEDURE — 93294 REM INTERROG EVL PM/LDLS PM: CPT | Performed by: INTERNAL MEDICINE

## 2017-12-05 NOTE — PROGRESS NOTES
Subjective:     Encounter Date:12/05/2017      Patient ID: Houston Armendariz is a 72 y.o. male.    Chief Complaint:  Hypertension   This is a chronic problem. Associated symptoms include malaise/fatigue and shortness of breath. Pertinent negatives include no chest pain or palpitations.   Coronary Artery Disease   Presents for follow-up visit. Symptoms include chest pressure, muscle weakness, shortness of breath and weight gain. Pertinent negatives include no chest pain, chest tightness, dizziness, leg swelling or palpitations. His past medical history is significant for past myocardial infarction. The symptoms have been worsening. Compliance with diet is good. Compliance with exercise is poor. Compliance with medications is good.       72-year-old gentleman with history of coronary artery disease, hypertension, sick sinus syndrome status post pacemaker presents today for reevaluation.  He still gets a little short of breath when walking stairs quickly.  Occasional edema in his cast little bit of fatigue.  All in all he says he is doing relatively well with really no specific complaints that has changed in the past year.    Review of Systems   Constitution: Positive for malaise/fatigue and weight gain.   Cardiovascular: Negative for chest pain, leg swelling and palpitations.   Respiratory: Positive for shortness of breath. Negative for chest tightness.    Endocrine: Positive for cold intolerance.   Skin: Positive for itching.   Musculoskeletal: Positive for muscle weakness.   Neurological: Negative for dizziness.   Psychiatric/Behavioral: Positive for depression.   All other systems reviewed and are negative.      Procedures       Objective:     Physical Exam   Constitutional: He is oriented to person, place, and time. He appears well-developed.   HENT:   Head: Normocephalic.   Eyes: Conjunctivae are normal.   Neck: Normal range of motion.   Cardiovascular: Normal rate, regular rhythm and normal heart sounds.     Pulmonary/Chest: Breath sounds normal.   Abdominal: Soft. Bowel sounds are normal.   Musculoskeletal: Normal range of motion. He exhibits no edema.   Neurological: He is alert and oriented to person, place, and time.   Skin: Skin is warm and dry.   Psychiatric: He has a normal mood and affect. His behavior is normal.   Vitals reviewed.      Lab Review:       Assessment:          Diagnosis Plan   1. Coronary artery disease involving native coronary artery of native heart without angina pectoris     2. SSS (sick sinus syndrome)     3. Essential hypertension            Plan:       1.  Coronary artery disease stable  2.  Hypertension blood pressures a little bit elevated today.  Told followed closely he says this is unusual.  3.  Pacemaker was interrogated and no arrhythmias.  He is at about 11 months before replacement pacemaker department is following.  4.  Continue same follow-up 6-12 months    Coronary Artery Disease  Plan  • Lifestyle modifications discussed include adhering to a heart healthy diet, avoidance of tobacco products, maintenance of a healthy weight, medication compliance, regular exercise and regular monitoring of cholesterol and blood pressure    Subjective - Objective  • There is a history of past MI  • There is a history of previous coronary artery bypass graft  • Current antiplatelet therapy includes aspirin 81 mg  •

## 2018-01-23 RX ORDER — METOPROLOL TARTRATE 50 MG/1
TABLET, FILM COATED ORAL
Qty: 60 TABLET | Refills: 5 | Status: ON HOLD | OUTPATIENT
Start: 2018-01-23 | End: 2018-05-07 | Stop reason: ALTCHOICE

## 2018-02-27 ENCOUNTER — OFFICE VISIT (OUTPATIENT)
Dept: GASTROENTEROLOGY | Facility: CLINIC | Age: 73
End: 2018-02-27

## 2018-02-27 VITALS
BODY MASS INDEX: 27.35 KG/M2 | WEIGHT: 191 LBS | HEIGHT: 70 IN | SYSTOLIC BLOOD PRESSURE: 142 MMHG | DIASTOLIC BLOOD PRESSURE: 76 MMHG | TEMPERATURE: 97.8 F

## 2018-02-27 DIAGNOSIS — D12.2 ADENOMATOUS POLYP OF ASCENDING COLON: Primary | ICD-10-CM

## 2018-02-27 DIAGNOSIS — D50.8 OTHER IRON DEFICIENCY ANEMIA: ICD-10-CM

## 2018-02-27 DIAGNOSIS — R12 HEARTBURN: ICD-10-CM

## 2018-02-27 PROBLEM — D64.9 ABSOLUTE ANEMIA: Status: ACTIVE | Noted: 2018-02-27

## 2018-02-27 PROCEDURE — 99204 OFFICE O/P NEW MOD 45 MIN: CPT | Performed by: INTERNAL MEDICINE

## 2018-02-27 RX ORDER — TRIHEXYPHENIDYL HYDROCHLORIDE 2 MG/1
2 TABLET ORAL DAILY
COMMUNITY
Start: 2018-01-08 | End: 2020-10-29 | Stop reason: ALTCHOICE

## 2018-02-27 RX ORDER — ATORVASTATIN CALCIUM 40 MG/1
40 TABLET, FILM COATED ORAL NIGHTLY
COMMUNITY
Start: 2018-01-20 | End: 2020-10-29 | Stop reason: ALTCHOICE

## 2018-02-27 RX ORDER — MEMANTINE HYDROCHLORIDE 10 MG/1
TABLET ORAL
Status: ON HOLD | COMMUNITY
Start: 2018-02-17 | End: 2018-05-07 | Stop reason: SDUPTHER

## 2018-02-27 NOTE — PROGRESS NOTES
Chief Complaint   Patient presents with   • Follow-up     history of polyps        Houston Armendariz is a 72 y.o. male who presents with History of colon polyps from 7 years ago, mild anemia, GERD, history of peptic ulcer disease, patient on Plavix    HPI Comments: 72-year-old gentleman with chronic GERD well-controlled with twice a day PPI.  No nausea, vomiting or dysphagia.  He has a mild anemia with a hemoglobin of 12.0.  No rectal bleeding or melena.  He does take Plavix.  He's had a pacemaker placed.  It colon polyp removed 7 years ago.  He is here to schedule EGD and colonoscopy for history of colon polyps, history of peptic ulcer disease and chronic GERD      Past Medical History:   Diagnosis Date   • Allergic rhinitis    • Anemia    • Anxiety    • Artery stenosis 03/15/2011    INFERIOR MESENTERIC   • Artery stenosis 03/15/2011    SUPERIOR MESENTERIC    • Arthritis    • Benign polyp of large intestine    • BPH (benign prostatic hypertrophy)     Dr. Peter.   • Bronchitis 10/30/2013   • CAD (coronary artery disease)     prior MI, status post 3V CABG 2008   • Carotid artery stenosis     Hx of left CEA in 2007   • Cervical stenosis of spinal canal     DDD and OA   • CKD (chronic kidney disease) stage 3, GFR 30-59 ml/min    • Colon polyp    • COPD (chronic obstructive pulmonary disease)    • CVA (cerebral infarction) 01/15/2014    OLD LEFT SUPERIOR PARIETAL INFARCT PER CT OF BRAIN   • Depression    • Diabetic peripheral neuropathy    • Dizziness    • Fainting    • Fatigue    • Gastritis    • GERD (gastroesophageal reflux disease)    • Headache    • Hearing loss     left   • Hematest positive stools 03/15/2012    NEGATIVE X 3   • History of esophagogastroduodenoscopy 06/29/2011   • Hx of Doppler ultrasound 03/15/2011    ARTERY CAROTID 3-2011 <50%stenosis brielle hx of left cea.....09-11 <50%STENOSIS ON RIGHT, NORMAL APPEARINT LEFT S/P ENDARTERECTOMY   • Hyperlipidemia    • Hypertension    • Lumbar canal stenosis      due to DDD and OA   • Muscular aches    • Myocardial infarction    • Nausea 10/30/2013   • Nocturia    • Parkinson's disease    • Peptic ulcer    • Peripheral arterial disease    • Pulmonary nodule     12/13 CT with RUL (new) and smaller RLL nodules (followed by Dr Reynoso), repeat CT 6 months                                    6/13 4mm RLL pulmonary nodule per CT, recheck 6 months   • RLS (restless legs syndrome)    • Shortness of breath    • Skin cancer    • Sleep apnea     on BIPAP   • SSS (sick sinus syndrome)    • Stroke 06/21/2017   • Type 2 diabetes mellitus    • Urinary hesitancy    • Vitamin D deficiency        Past Surgical History:   Procedure Laterality Date   • BACK SURGERY      2005, 2008 C-SPINE, L-SPINE   • CATARACT EXTRACTION, BILATERAL  03/01/2009    WITH IOL   • CHOLECYSTECTOMY      2011 DYSKINESIA   • CORONARY ARTERY BYPASS GRAFT     • PACEMAKER IMPLANTATION      PERMANENT   • TEMPORAL ARTERY BIOPSY     • THROMBOENDARTERECTOMY  01/01/2008    CAROTID   • VEIN LIGATION AND STRIPPING     • VITRECTOMY PARS PLANA  09/15/2013    LOCAL BLOCK BY SURGEON LEFT         Current Outpatient Prescriptions:   •  aspirin 81 MG EC tablet, Take 81 mg by mouth Daily., Disp: , Rfl:   •  atorvastatin (LIPITOR) 40 MG tablet, , Disp: , Rfl:   •  budesonide-formoterol (SYMBICORT) 80-4.5 MCG/ACT inhaler, Inhale 2 puffs 2 (two) times a day. RINSE MOUTH AFTER USE, Disp: , Rfl:   •  carbidopa-levodopa (SINEMET)  MG per tablet, Take 2 tablets by mouth 3 (three) times a day., Disp: , Rfl:   •  cholecalciferol (VITAMIN D3) 88181 units capsule, Take 50,000 Units by mouth Daily., Disp: , Rfl:   •  clopidogrel (PLAVIX) 75 MG tablet, Take 75 mg by mouth Daily., Disp: , Rfl:   •  cyanocobalamin (VITAMIN B-12) 1000 MCG tablet, Take 100 mcg by mouth Daily., Disp: , Rfl:   •  FLUoxetine (PROzac) 20 MG capsule, Take 20 mg by mouth Every Night., Disp: , Rfl:   •  FLUoxetine (PROzac) 40 MG capsule, Take 40 mg by mouth Every Morning.,  Disp: , Rfl:   •  gabapentin (NEURONTIN) 600 MG tablet, Take 600 mg by mouth. 300 mg in the morning, 1200mg tablets nightly., Disp: , Rfl:   •  memantine (NAMENDA) 10 MG tablet, , Disp: , Rfl:   •  Pantoprazole Sodium 40 MG pack, Take 40 mg by mouth 2 (two) times a day., Disp: , Rfl:   •  rivastigmine (EXELON) 4.5 MG capsule, Take 4.5 mg by mouth 2 (two) times a day., Disp: , Rfl:   •  tamsulosin (FLOMAX) 0.4 MG capsule 24 hr capsule, Take 0.4 mg by mouth Every Night., Disp: , Rfl:   •  traZODone (DESYREL) 50 MG tablet, Take 25 mg by mouth Every Night., Disp: , Rfl:   •  trihexyphenidyl (ARTANE) 2 MG tablet, , Disp: , Rfl:   •  amLODIPine (NORVASC) 5 MG tablet, Take 1 tablet by mouth Daily., Disp: 30 tablet, Rfl: 0  •  fluticasone (VERAMYST) 27.5 MCG/SPRAY nasal spray, 2 sprays into each nostril daily., Disp: , Rfl:   •  HYDROcodone-acetaminophen (NORCO) 7.5-325 MG per tablet, Take 1 tablet by mouth., Disp: , Rfl:   •  lisinopril (PRINIVIL,ZESTRIL) 10 MG tablet, Take 10 mg by mouth 2 (Two) Times a Day., Disp: , Rfl:   •  memantine (NAMENDA XR) 28 MG capsule sustained-release 24 hr extended release capsule, Take 28 mg by mouth daily., Disp: , Rfl:   •  metoprolol tartrate (LOPRESSOR) 50 MG tablet, TAKE ONE TABLET BY MOUTH TWICE A DAY, Disp: 60 tablet, Rfl: 5  •  nitroglycerin (NITROSTAT) 0.4 MG SL tablet, Place 0.4 mg under the tongue every 5 (five) minutes as needed for chest pain. Take no more than 3 doses in 15 minutes. IF PAIN REMAINS AFTER 5 MINUTES, CALL 911 AND REPEAT DOSE, Disp: , Rfl:   •  POLYETHYLENE GLYCOL EX, Daily., Disp: , Rfl:   •  senna-docusate (PERICOLACE) 8.6-50 MG per tablet, Take 2 tablets by mouth Daily., Disp: , Rfl:     No Known Allergies    Social History     Social History   • Marital status:      Spouse name: N/A   • Number of children: N/A   • Years of education: N/A     Occupational History   • Not on file.     Social History Main Topics   • Smoking status: Former Smoker      Packs/day: 1.00     Types: Cigarettes     Quit date: 6/21/2017   • Smokeless tobacco: Never Used      Comment: Quit when he had a stroke in June 2017   • Alcohol use No      Comment: caffeine use   • Drug use: No   • Sexual activity: Defer     Other Topics Concern   • Not on file     Social History Narrative       Family History   Problem Relation Age of Onset   • Alzheimer's disease Mother    • Diabetes Mother    • Hypertension Mother    • Hypertension Father    • Heart attack Father    • Heart disease Father    • Hypertension Sister    • Heart disease Sister    • Diabetes Brother    • Heart disease Brother    • Hypertension Brother    • Stroke Brother        Review of Systems   Gastrointestinal: Negative for abdominal distention, abdominal pain, anal bleeding, blood in stool, constipation, nausea and rectal pain.   All other systems reviewed and are negative.      Vitals:    02/27/18 1103   BP: 142/76   Temp: 97.8 °F (36.6 °C)       Physical Exam   Constitutional: He is oriented to person, place, and time. He appears well-developed and well-nourished.   HENT:   Head: Normocephalic and atraumatic.   Eyes: Conjunctivae and EOM are normal.   Neck: Normal range of motion. No tracheal deviation present.   Cardiovascular: Normal rate and regular rhythm.    Pulmonary/Chest: Effort normal and breath sounds normal. No respiratory distress.   Abdominal: Soft. Bowel sounds are normal. He exhibits no distension and no mass. There is no tenderness. There is no rebound and no guarding.   Musculoskeletal: Normal range of motion.   Neurological: He is alert and oriented to person, place, and time.   Skin: Skin is warm and dry.   Psychiatric: He has a normal mood and affect. Judgment normal.   Nursing note and vitals reviewed.    Problem list    GERD  Anemia  Colon polyp      Assessment/Plan    EGD and colonoscopy to be scheduled at [facility] endoscopy and to be done 5 days off of Plavix  Continue PPI    GERD lifestyle modifications  discussed 20 min

## 2018-03-08 ENCOUNTER — CLINICAL SUPPORT NO REQUIREMENTS (OUTPATIENT)
Dept: CARDIOLOGY | Facility: CLINIC | Age: 73
End: 2018-03-08

## 2018-03-08 DIAGNOSIS — I49.5 SICK SINUS SYNDROME (HCC): Primary | ICD-10-CM

## 2018-03-08 PROCEDURE — 93280 PM DEVICE PROGR EVAL DUAL: CPT | Performed by: INTERNAL MEDICINE

## 2018-04-16 ENCOUNTER — HOSPITAL ENCOUNTER (OUTPATIENT)
Facility: HOSPITAL | Age: 73
Setting detail: HOSPITAL OUTPATIENT SURGERY
Discharge: HOME | End: 2018-04-16
Attending: INTERNAL MEDICINE | Admitting: INTERNAL MEDICINE

## 2018-04-16 ENCOUNTER — ANESTHESIA (OUTPATIENT)
Dept: GASTROENTEROLOGY | Facility: HOSPITAL | Age: 73
End: 2018-04-16

## 2018-04-16 ENCOUNTER — ANESTHESIA EVENT (OUTPATIENT)
Dept: GASTROENTEROLOGY | Facility: HOSPITAL | Age: 73
End: 2018-04-16

## 2018-04-16 VITALS
DIASTOLIC BLOOD PRESSURE: 65 MMHG | BODY MASS INDEX: 27.11 KG/M2 | OXYGEN SATURATION: 97 % | HEIGHT: 70 IN | WEIGHT: 189.38 LBS | TEMPERATURE: 98.2 F | SYSTOLIC BLOOD PRESSURE: 125 MMHG | RESPIRATION RATE: 16 BRPM | HEART RATE: 67 BPM

## 2018-04-16 DIAGNOSIS — R12 HEARTBURN: ICD-10-CM

## 2018-04-16 DIAGNOSIS — D50.8 OTHER IRON DEFICIENCY ANEMIA: ICD-10-CM

## 2018-04-16 DIAGNOSIS — D12.2 ADENOMATOUS POLYP OF ASCENDING COLON: ICD-10-CM

## 2018-04-16 LAB — GLUCOSE BLDC GLUCOMTR-MCNC: 119 MG/DL (ref 70–130)

## 2018-04-16 PROCEDURE — S0260 H&P FOR SURGERY: HCPCS | Performed by: INTERNAL MEDICINE

## 2018-04-16 PROCEDURE — 82962 GLUCOSE BLOOD TEST: CPT

## 2018-04-16 PROCEDURE — 43251 EGD REMOVE LESION SNARE: CPT | Performed by: INTERNAL MEDICINE

## 2018-04-16 PROCEDURE — 25010000002 PROPOFOL 10 MG/ML EMULSION: Performed by: NURSE ANESTHETIST, CERTIFIED REGISTERED

## 2018-04-16 PROCEDURE — 88312 SPECIAL STAINS GROUP 1: CPT | Performed by: INTERNAL MEDICINE

## 2018-04-16 PROCEDURE — 43239 EGD BIOPSY SINGLE/MULTIPLE: CPT | Performed by: INTERNAL MEDICINE

## 2018-04-16 PROCEDURE — 88342 IMHCHEM/IMCYTCHM 1ST ANTB: CPT | Performed by: INTERNAL MEDICINE

## 2018-04-16 PROCEDURE — 45385 COLONOSCOPY W/LESION REMOVAL: CPT | Performed by: INTERNAL MEDICINE

## 2018-04-16 PROCEDURE — 45380 COLONOSCOPY AND BIOPSY: CPT | Performed by: INTERNAL MEDICINE

## 2018-04-16 PROCEDURE — 25010000002 PHENYLEPHRINE PER 1 ML: Performed by: NURSE ANESTHETIST, CERTIFIED REGISTERED

## 2018-04-16 PROCEDURE — 88305 TISSUE EXAM BY PATHOLOGIST: CPT | Performed by: INTERNAL MEDICINE

## 2018-04-16 DEVICE — DEV CLIP ENDO RESOLUTION360 CONTRL ROT 235CM: Type: IMPLANTABLE DEVICE | Site: STOMACH | Status: FUNCTIONAL

## 2018-04-16 RX ORDER — SODIUM CHLORIDE, SODIUM LACTATE, POTASSIUM CHLORIDE, CALCIUM CHLORIDE 600; 310; 30; 20 MG/100ML; MG/100ML; MG/100ML; MG/100ML
1000 INJECTION, SOLUTION INTRAVENOUS CONTINUOUS
Status: DISCONTINUED | OUTPATIENT
Start: 2018-04-16 | End: 2018-04-16 | Stop reason: HOSPADM

## 2018-04-16 RX ORDER — LIDOCAINE HYDROCHLORIDE 20 MG/ML
INJECTION, SOLUTION INFILTRATION; PERINEURAL AS NEEDED
Status: DISCONTINUED | OUTPATIENT
Start: 2018-04-16 | End: 2018-04-16 | Stop reason: SURG

## 2018-04-16 RX ORDER — PROPOFOL 10 MG/ML
VIAL (ML) INTRAVENOUS CONTINUOUS PRN
Status: DISCONTINUED | OUTPATIENT
Start: 2018-04-16 | End: 2018-04-16 | Stop reason: SURG

## 2018-04-16 RX ORDER — PROPOFOL 10 MG/ML
VIAL (ML) INTRAVENOUS AS NEEDED
Status: DISCONTINUED | OUTPATIENT
Start: 2018-04-16 | End: 2018-04-16 | Stop reason: SURG

## 2018-04-16 RX ORDER — LIDOCAINE HYDROCHLORIDE 10 MG/ML
0.5 INJECTION, SOLUTION INFILTRATION; PERINEURAL ONCE AS NEEDED
Status: DISCONTINUED | OUTPATIENT
Start: 2018-04-16 | End: 2018-04-16 | Stop reason: HOSPADM

## 2018-04-16 RX ORDER — SODIUM CHLORIDE 0.9 % (FLUSH) 0.9 %
3 SYRINGE (ML) INJECTION AS NEEDED
Status: DISCONTINUED | OUTPATIENT
Start: 2018-04-16 | End: 2018-04-16 | Stop reason: HOSPADM

## 2018-04-16 RX ADMIN — SODIUM CHLORIDE, POTASSIUM CHLORIDE, SODIUM LACTATE AND CALCIUM CHLORIDE 1000 ML: 600; 310; 30; 20 INJECTION, SOLUTION INTRAVENOUS at 07:32

## 2018-04-16 RX ADMIN — PHENYLEPHRINE HYDROCHLORIDE 100 MCG: 10 INJECTION INTRAVENOUS at 08:53

## 2018-04-16 RX ADMIN — PROPOFOL 100 MG: 10 INJECTION, EMULSION INTRAVENOUS at 08:15

## 2018-04-16 RX ADMIN — EPHEDRINE SULFATE 25 MG: 50 INJECTION INTRAMUSCULAR; INTRAVENOUS; SUBCUTANEOUS at 08:31

## 2018-04-16 RX ADMIN — PROPOFOL 200 MCG/KG/MIN: 10 INJECTION, EMULSION INTRAVENOUS at 08:15

## 2018-04-16 RX ADMIN — EPHEDRINE SULFATE 25 MG: 50 INJECTION INTRAMUSCULAR; INTRAVENOUS; SUBCUTANEOUS at 08:45

## 2018-04-16 RX ADMIN — LIDOCAINE HYDROCHLORIDE 60 MG: 20 INJECTION, SOLUTION INFILTRATION; PERINEURAL at 08:15

## 2018-04-16 RX ADMIN — EPHEDRINE SULFATE 25 MG: 50 INJECTION INTRAMUSCULAR; INTRAVENOUS; SUBCUTANEOUS at 08:28

## 2018-04-16 RX ADMIN — EPHEDRINE SULFATE 25 MG: 50 INJECTION INTRAMUSCULAR; INTRAVENOUS; SUBCUTANEOUS at 08:51

## 2018-04-16 NOTE — H&P
Progress Notes  Encounter Date: 2/27/2018 11:00 AM  Amauri Burden MD   Gastroenterology      []Manual[]Template  []Copied       Chief Complaint   Patient presents with   • Follow-up       history of polyps          Houston Armendariz is a 72 y.o. male who presents with History of colon polyps from 7 years ago, mild anemia, GERD, history of peptic ulcer disease, patient on Plavix     HPI Comments: 72-year-old gentleman with chronic GERD well-controlled with twice a day PPI.  No nausea, vomiting or dysphagia.  He has a mild anemia with a hemoglobin of 12.0.  No rectal bleeding or melena.  He does take Plavix.  He's had a pacemaker placed.  It colon polyp removed 7 years ago.  He is here to schedule EGD and colonoscopy for history of colon polyps, history of peptic ulcer disease and chronic GERD             Past Medical History:   Diagnosis Date   • Allergic rhinitis     • Anemia     • Anxiety     • Artery stenosis 03/15/2011     INFERIOR MESENTERIC   • Artery stenosis 03/15/2011     SUPERIOR MESENTERIC    • Arthritis     • Benign polyp of large intestine     • BPH (benign prostatic hypertrophy)       Dr. Peter.   • Bronchitis 10/30/2013   • CAD (coronary artery disease)       prior MI, status post 3V CABG 2008   • Carotid artery stenosis       Hx of left CEA in 2007   • Cervical stenosis of spinal canal       DDD and OA   • CKD (chronic kidney disease) stage 3, GFR 30-59 ml/min     • Colon polyp     • COPD (chronic obstructive pulmonary disease)     • CVA (cerebral infarction) 01/15/2014     OLD LEFT SUPERIOR PARIETAL INFARCT PER CT OF BRAIN   • Depression     • Diabetic peripheral neuropathy     • Dizziness     • Fainting     • Fatigue     • Gastritis     • GERD (gastroesophageal reflux disease)     • Headache     • Hearing loss       left   • Hematest positive stools 03/15/2012     NEGATIVE X 3   • History of esophagogastroduodenoscopy 06/29/2011   • Hx of Doppler ultrasound 03/15/2011     ARTERY CAROTID 3-2011  <50%stenosis brielle hx of left cea.....09-11 <50%STENOSIS ON RIGHT, NORMAL APPEARINT LEFT S/P ENDARTERECTOMY   • Hyperlipidemia     • Hypertension     • Lumbar canal stenosis       due to DDD and OA   • Muscular aches     • Myocardial infarction     • Nausea 10/30/2013   • Nocturia     • Parkinson's disease     • Peptic ulcer     • Peripheral arterial disease     • Pulmonary nodule       12/13 CT with RUL (new) and smaller RLL nodules (followed by Dr Reynoso), repeat CT 6 months                                    6/13 4mm RLL pulmonary nodule per CT, recheck 6 months   • RLS (restless legs syndrome)     • Shortness of breath     • Skin cancer     • Sleep apnea       on BIPAP   • SSS (sick sinus syndrome)     • Stroke 06/21/2017   • Type 2 diabetes mellitus     • Urinary hesitancy     • Vitamin D deficiency           Past Surgical History:   Procedure Laterality Date   • BACK SURGERY         2005, 2008 C-SPINE, L-SPINE   • CATARACT EXTRACTION, BILATERAL   03/01/2009     WITH IOL   • CHOLECYSTECTOMY         2011 DYSKINESIA   • CORONARY ARTERY BYPASS GRAFT       • PACEMAKER IMPLANTATION         PERMANENT   • TEMPORAL ARTERY BIOPSY       • THROMBOENDARTERECTOMY   01/01/2008     CAROTID   • VEIN LIGATION AND STRIPPING       • VITRECTOMY PARS PLANA   09/15/2013     LOCAL BLOCK BY SURGEON LEFT            Current Outpatient Prescriptions:   •  aspirin 81 MG EC tablet, Take 81 mg by mouth Daily., Disp: , Rfl:   •  atorvastatin (LIPITOR) 40 MG tablet, , Disp: , Rfl:   •  budesonide-formoterol (SYMBICORT) 80-4.5 MCG/ACT inhaler, Inhale 2 puffs 2 (two) times a day. RINSE MOUTH AFTER USE, Disp: , Rfl:   •  carbidopa-levodopa (SINEMET)  MG per tablet, Take 2 tablets by mouth 3 (three) times a day., Disp: , Rfl:   •  cholecalciferol (VITAMIN D3) 45561 units capsule, Take 50,000 Units by mouth Daily., Disp: , Rfl:   •  clopidogrel (PLAVIX) 75 MG tablet, Take 75 mg by mouth Daily., Disp: , Rfl:   •  cyanocobalamin (VITAMIN B-12) 1000  MCG tablet, Take 100 mcg by mouth Daily., Disp: , Rfl:   •  FLUoxetine (PROzac) 20 MG capsule, Take 20 mg by mouth Every Night., Disp: , Rfl:   •  FLUoxetine (PROzac) 40 MG capsule, Take 40 mg by mouth Every Morning., Disp: , Rfl:   •  gabapentin (NEURONTIN) 600 MG tablet, Take 600 mg by mouth. 300 mg in the morning, 1200mg tablets nightly., Disp: , Rfl:   •  memantine (NAMENDA) 10 MG tablet, , Disp: , Rfl:   •  Pantoprazole Sodium 40 MG pack, Take 40 mg by mouth 2 (two) times a day., Disp: , Rfl:   •  rivastigmine (EXELON) 4.5 MG capsule, Take 4.5 mg by mouth 2 (two) times a day., Disp: , Rfl:   •  tamsulosin (FLOMAX) 0.4 MG capsule 24 hr capsule, Take 0.4 mg by mouth Every Night., Disp: , Rfl:   •  traZODone (DESYREL) 50 MG tablet, Take 25 mg by mouth Every Night., Disp: , Rfl:   •  trihexyphenidyl (ARTANE) 2 MG tablet, , Disp: , Rfl:   •  amLODIPine (NORVASC) 5 MG tablet, Take 1 tablet by mouth Daily., Disp: 30 tablet, Rfl: 0  •  fluticasone (VERAMYST) 27.5 MCG/SPRAY nasal spray, 2 sprays into each nostril daily., Disp: , Rfl:   •  HYDROcodone-acetaminophen (NORCO) 7.5-325 MG per tablet, Take 1 tablet by mouth., Disp: , Rfl:   •  lisinopril (PRINIVIL,ZESTRIL) 10 MG tablet, Take 10 mg by mouth 2 (Two) Times a Day., Disp: , Rfl:   •  memantine (NAMENDA XR) 28 MG capsule sustained-release 24 hr extended release capsule, Take 28 mg by mouth daily., Disp: , Rfl:   •  metoprolol tartrate (LOPRESSOR) 50 MG tablet, TAKE ONE TABLET BY MOUTH TWICE A DAY, Disp: 60 tablet, Rfl: 5  •  nitroglycerin (NITROSTAT) 0.4 MG SL tablet, Place 0.4 mg under the tongue every 5 (five) minutes as needed for chest pain. Take no more than 3 doses in 15 minutes. IF PAIN REMAINS AFTER 5 MINUTES, CALL 911 AND REPEAT DOSE, Disp: , Rfl:   •  POLYETHYLENE GLYCOL EX, Daily., Disp: , Rfl:   •  senna-docusate (PERICOLACE) 8.6-50 MG per tablet, Take 2 tablets by mouth Daily., Disp: , Rfl:      No Known Allergies     Social History            Social  History   • Marital status:        Spouse name: N/A   • Number of children: N/A   • Years of education: N/A          Occupational History   • Not on file.             Social History Main Topics   • Smoking status: Former Smoker       Packs/day: 1.00       Types: Cigarettes       Quit date: 6/21/2017   • Smokeless tobacco: Never Used         Comment: Quit when he had a stroke in June 2017   • Alcohol use No         Comment: caffeine use   • Drug use: No   • Sexual activity: Defer           Other Topics Concern   • Not on file      Social History Narrative               Family History   Problem Relation Age of Onset   • Alzheimer's disease Mother     • Diabetes Mother     • Hypertension Mother     • Hypertension Father     • Heart attack Father     • Heart disease Father     • Hypertension Sister     • Heart disease Sister     • Diabetes Brother     • Heart disease Brother     • Hypertension Brother     • Stroke Brother           Review of Systems   Gastrointestinal: Negative for abdominal distention, abdominal pain, anal bleeding, blood in stool, constipation, nausea and rectal pain.   All other systems reviewed and are negative.            Vitals:     02/27/18 1103   BP: 142/76   Temp: 97.8 °F (36.6 °C)         Physical Exam   Constitutional: He is oriented to person, place, and time. He appears well-developed and well-nourished.   HENT:   Head: Normocephalic and atraumatic.   Eyes: Conjunctivae and EOM are normal.   Neck: Normal range of motion. No tracheal deviation present.   Cardiovascular: Normal rate and regular rhythm.    Pulmonary/Chest: Effort normal and breath sounds normal. No respiratory distress.   Abdominal: Soft. Bowel sounds are normal. He exhibits no distension and no mass. There is no tenderness. There is no rebound and no guarding.   Musculoskeletal: Normal range of motion.   Neurological: He is alert and oriented to person, place, and time.   Skin: Skin is warm and dry.   Psychiatric: He  has a normal mood and affect. Judgment normal.   Nursing note and vitals reviewed.     Problem list     GERD  Anemia  Colon polyp        Assessment/Plan     EGD and colonoscopy to be scheduled at [facility] endoscopy and to be done 5 days off of Plavix  Continue PPI     GERD lifestyle modifications discussed 20 min          Office Visit on 2/27/2018            Detailed Report

## 2018-04-16 NOTE — ANESTHESIA POSTPROCEDURE EVALUATION
"Patient: Hosuton Armendariz    Procedure Summary     Date:  04/16/18 Room / Location:  [facility] ENDOSCOPY 8 /  [facility]    Anesthesia Start:  0801 Anesthesia Stop:  0902    Procedures:       ESOPHAGOGASTRODUODENOSCOPY WITH BIOPSIES AND HOT SNARE POLYPECTOMY CLIP X2 (N/A Esophagus)      COLONOSCOPY TO CECUM AND TERMINAL ILEUM WITH HOT SNARE AND COLD BIOPSY POLYPECTOMIES (N/A ) Diagnosis:       Heartburn      Other iron deficiency anemia      Adenomatous polyp of ascending colon      (Heartburn [R12])      (Other iron deficiency anemia [D50.8])      (Adenomatous polyp of ascending colon [D12.2])    Surgeon:  Amauri Burden MD Provider:  Karuna Thurston MD    Anesthesia Type:  MAC ASA Status:  4          Anesthesia Type: MAC  Last vitals  BP   127/66 (04/16/18 0922)   Temp   36.8 °C (98.2 °F) (04/16/18 0922)   Pulse   64 (04/16/18 0922)   Resp   16 (04/16/18 0922)     SpO2   98 % (04/16/18 0922)     Post Anesthesia Care and Evaluation    Patient location during evaluation: bedside  Patient participation: complete - patient participated  Level of consciousness: awake and alert  Pain management: adequate  Airway patency: patent  Anesthetic complications: No anesthetic complications  PONV Status: none  Cardiovascular status: acceptable  Respiratory status: acceptable  Hydration status: acceptable    Comments: /66 (BP Location: Left arm, Patient Position: Sitting)   Pulse 64   Temp 36.8 °C (98.2 °F) (Oral)   Resp 16   Ht 177.8 cm (70\")   Wt 85.9 kg (189 lb 6 oz)   SpO2 98%   BMI 27.17 kg/m²         "

## 2018-04-16 NOTE — ANESTHESIA PREPROCEDURE EVALUATION
Anesthesia Evaluation     Patient summary reviewed   NPO Solid Status: > 8 hours  NPO Liquid Status: > 8 hours           Airway   Mallampati: II  TM distance: >3 FB  Dental      Pulmonary    (+) COPD, shortness of breath, sleep apnea,   Cardiovascular     Rhythm: regular  Rate: normal    (+) pacemaker, hypertension, past MI , CAD, CABG, PVD, hyperlipidemia,  carotid artery disease      Neuro/Psych  (+) CVA residual symptoms,     GI/Hepatic/Renal/Endo    (+)  GERD, PUD,  diabetes mellitus type 2,     Musculoskeletal     Abdominal    Substance History      OB/GYN          Other   (+) arthritis                     Anesthesia Plan    ASA 4     MAC   total IV anesthesia  Anesthetic plan and risks discussed with patient.    Plan discussed with CRNA.

## 2018-04-18 LAB
CYTO UR: NORMAL
LAB AP CASE REPORT: NORMAL
Lab: NORMAL
PATH REPORT.ADDENDUM SPEC: NORMAL
PATH REPORT.FINAL DX SPEC: NORMAL
PATH REPORT.GROSS SPEC: NORMAL

## 2018-04-19 ENCOUNTER — CLINICAL SUPPORT NO REQUIREMENTS (OUTPATIENT)
Dept: CARDIOLOGY | Facility: CLINIC | Age: 73
End: 2018-04-19

## 2018-04-19 ENCOUNTER — TELEPHONE (OUTPATIENT)
Dept: CARDIOLOGY | Facility: CLINIC | Age: 73
End: 2018-04-19

## 2018-04-19 DIAGNOSIS — I49.5 SICK SINUS SYNDROME (HCC): Primary | ICD-10-CM

## 2018-04-19 NOTE — TELEPHONE ENCOUNTER
Patient's Medtronic dual chamber pacemaker has reached JOHNIE as of 4/4/18. Both A & V leads are model 5076, which are on the MRI compatible list.

## 2018-04-26 ENCOUNTER — TELEPHONE (OUTPATIENT)
Dept: GASTROENTEROLOGY | Facility: CLINIC | Age: 73
End: 2018-04-26

## 2018-04-26 NOTE — TELEPHONE ENCOUNTER
----- Message from Amauri Burden MD sent at 4/20/2018  5:00 PM EDT -----  Tubular adenoma colon polyps, continue twice a day PPI,  colonoscopy recall 3 years

## 2018-05-03 ENCOUNTER — TRANSCRIBE ORDERS (OUTPATIENT)
Dept: LAB | Facility: HOSPITAL | Age: 73
End: 2018-05-03

## 2018-05-03 ENCOUNTER — LAB (OUTPATIENT)
Dept: LAB | Facility: HOSPITAL | Age: 73
End: 2018-05-03
Attending: INTERNAL MEDICINE

## 2018-05-03 DIAGNOSIS — Z01.810 PRE-OPERATIVE CARDIOVASCULAR EXAMINATION: ICD-10-CM

## 2018-05-03 DIAGNOSIS — Z13.6 SCREENING FOR ISCHEMIC HEART DISEASE: ICD-10-CM

## 2018-05-03 DIAGNOSIS — Z01.810 PRE-OPERATIVE CARDIOVASCULAR EXAMINATION: Primary | ICD-10-CM

## 2018-05-03 LAB
ANION GAP SERPL CALCULATED.3IONS-SCNC: 14 MMOL/L
BASOPHILS # BLD AUTO: 0.03 10*3/MM3 (ref 0–0.2)
BASOPHILS NFR BLD AUTO: 0.4 % (ref 0–1.5)
BUN BLD-MCNC: 18 MG/DL (ref 8–23)
BUN/CREAT SERPL: 11.5 (ref 7–25)
CALCIUM SPEC-SCNC: 9.3 MG/DL (ref 8.6–10.5)
CHLORIDE SERPL-SCNC: 101 MMOL/L (ref 98–107)
CO2 SERPL-SCNC: 25 MMOL/L (ref 22–29)
CREAT BLD-MCNC: 1.57 MG/DL (ref 0.76–1.27)
DEPRECATED RDW RBC AUTO: 50.2 FL (ref 37–54)
EOSINOPHIL # BLD AUTO: 0.22 10*3/MM3 (ref 0–0.7)
EOSINOPHIL NFR BLD AUTO: 2.6 % (ref 0.3–6.2)
ERYTHROCYTE [DISTWIDTH] IN BLOOD BY AUTOMATED COUNT: 14.9 % (ref 11.5–14.5)
GFR SERPL CREATININE-BSD FRML MDRD: 44 ML/MIN/1.73
GLUCOSE BLD-MCNC: 89 MG/DL (ref 65–99)
HCT VFR BLD AUTO: 37.9 % (ref 40.4–52.2)
HGB BLD-MCNC: 12.2 G/DL (ref 13.7–17.6)
IMM GRANULOCYTES # BLD: 0.04 10*3/MM3 (ref 0–0.03)
IMM GRANULOCYTES NFR BLD: 0.5 % (ref 0–0.5)
LYMPHOCYTES # BLD AUTO: 2.85 10*3/MM3 (ref 0.9–4.8)
LYMPHOCYTES NFR BLD AUTO: 33.8 % (ref 19.6–45.3)
MCH RBC QN AUTO: 29.6 PG (ref 27–32.7)
MCHC RBC AUTO-ENTMCNC: 32.2 G/DL (ref 32.6–36.4)
MCV RBC AUTO: 92 FL (ref 79.8–96.2)
MONOCYTES # BLD AUTO: 0.72 10*3/MM3 (ref 0.2–1.2)
MONOCYTES NFR BLD AUTO: 8.5 % (ref 5–12)
NEUTROPHILS # BLD AUTO: 4.61 10*3/MM3 (ref 1.9–8.1)
NEUTROPHILS NFR BLD AUTO: 54.7 % (ref 42.7–76)
PLATELET # BLD AUTO: 236 10*3/MM3 (ref 140–500)
PMV BLD AUTO: 9.6 FL (ref 6–12)
POTASSIUM BLD-SCNC: 5.2 MMOL/L (ref 3.5–5.2)
RBC # BLD AUTO: 4.12 10*6/MM3 (ref 4.6–6)
SODIUM BLD-SCNC: 140 MMOL/L (ref 136–145)
WBC NRBC COR # BLD: 8.43 10*3/MM3 (ref 4.5–10.7)

## 2018-05-03 PROCEDURE — 36415 COLL VENOUS BLD VENIPUNCTURE: CPT

## 2018-05-03 PROCEDURE — 80048 BASIC METABOLIC PNL TOTAL CA: CPT

## 2018-05-03 PROCEDURE — 85025 COMPLETE CBC W/AUTO DIFF WBC: CPT

## 2018-05-07 ENCOUNTER — HOSPITAL ENCOUNTER (OUTPATIENT)
Facility: HOSPITAL | Age: 73
Setting detail: HOSPITAL OUTPATIENT SURGERY
Discharge: HOME | End: 2018-05-07
Attending: INTERNAL MEDICINE | Admitting: INTERNAL MEDICINE

## 2018-05-07 VITALS
DIASTOLIC BLOOD PRESSURE: 71 MMHG | RESPIRATION RATE: 16 BRPM | TEMPERATURE: 97.6 F | WEIGHT: 185 LBS | BODY MASS INDEX: 26.48 KG/M2 | OXYGEN SATURATION: 94 % | HEART RATE: 60 BPM | SYSTOLIC BLOOD PRESSURE: 121 MMHG | HEIGHT: 70 IN

## 2018-05-07 LAB — GLUCOSE BLDC GLUCOMTR-MCNC: 115 MG/DL (ref 70–130)

## 2018-05-07 PROCEDURE — 82962 GLUCOSE BLOOD TEST: CPT

## 2018-05-07 PROCEDURE — 25010000003 CEFAZOLIN IN DEXTROSE 2-4 GM/100ML-% SOLUTION: Performed by: INTERNAL MEDICINE

## 2018-05-07 PROCEDURE — 33228 REMV&REPLC PM GEN DUAL LEAD: CPT | Performed by: INTERNAL MEDICINE

## 2018-05-07 PROCEDURE — 25010000002 MIDAZOLAM PER 1 MG: Performed by: INTERNAL MEDICINE

## 2018-05-07 PROCEDURE — 25010000002 FENTANYL CITRATE (PF) 100 MCG/2ML SOLUTION: Performed by: INTERNAL MEDICINE

## 2018-05-07 PROCEDURE — C1785 PMKR, DUAL, RATE-RESP: HCPCS | Performed by: INTERNAL MEDICINE

## 2018-05-07 PROCEDURE — 99152 MOD SED SAME PHYS/QHP 5/>YRS: CPT | Performed by: INTERNAL MEDICINE

## 2018-05-07 DEVICE — GEN PM ADVISA SURESCAN DR MRI: Type: IMPLANTABLE DEVICE | Status: FUNCTIONAL

## 2018-05-07 RX ORDER — GABAPENTIN 600 MG/1
600 TABLET ORAL NIGHTLY
COMMUNITY

## 2018-05-07 RX ORDER — AMLODIPINE BESYLATE 5 MG/1
5 TABLET ORAL DAILY
COMMUNITY

## 2018-05-07 RX ORDER — SODIUM CHLORIDE 9 MG/ML
75 INJECTION, SOLUTION INTRAVENOUS CONTINUOUS
Status: DISCONTINUED | OUTPATIENT
Start: 2018-05-07 | End: 2018-05-07 | Stop reason: HOSPADM

## 2018-05-07 RX ORDER — LIDOCAINE HYDROCHLORIDE 10 MG/ML
0.1 INJECTION, SOLUTION EPIDURAL; INFILTRATION; INTRACAUDAL; PERINEURAL ONCE AS NEEDED
Status: DISCONTINUED | OUTPATIENT
Start: 2018-05-07 | End: 2018-05-07 | Stop reason: HOSPADM

## 2018-05-07 RX ORDER — MIDAZOLAM HYDROCHLORIDE 1 MG/ML
INJECTION INTRAMUSCULAR; INTRAVENOUS AS NEEDED
Status: DISCONTINUED | OUTPATIENT
Start: 2018-05-07 | End: 2018-05-07 | Stop reason: HOSPADM

## 2018-05-07 RX ORDER — SODIUM CHLORIDE 0.9 % (FLUSH) 0.9 %
1-10 SYRINGE (ML) INJECTION AS NEEDED
Status: DISCONTINUED | OUTPATIENT
Start: 2018-05-07 | End: 2018-05-07 | Stop reason: HOSPADM

## 2018-05-07 RX ORDER — LIDOCAINE HYDROCHLORIDE 20 MG/ML
INJECTION, SOLUTION INFILTRATION; PERINEURAL AS NEEDED
Status: DISCONTINUED | OUTPATIENT
Start: 2018-05-07 | End: 2018-05-07 | Stop reason: HOSPADM

## 2018-05-07 RX ORDER — MELATONIN
1000 DAILY
COMMUNITY

## 2018-05-07 RX ORDER — FENTANYL CITRATE 50 UG/ML
INJECTION, SOLUTION INTRAMUSCULAR; INTRAVENOUS AS NEEDED
Status: DISCONTINUED | OUTPATIENT
Start: 2018-05-07 | End: 2018-05-07 | Stop reason: HOSPADM

## 2018-05-07 RX ORDER — CEFAZOLIN SODIUM 2 G/100ML
INJECTION, SOLUTION INTRAVENOUS CONTINUOUS PRN
Status: DISCONTINUED | OUTPATIENT
Start: 2018-05-07 | End: 2018-05-07 | Stop reason: HOSPADM

## 2018-05-07 RX ADMIN — SODIUM CHLORIDE 75 ML/HR: 9 INJECTION, SOLUTION INTRAVENOUS at 06:58

## 2018-05-07 NOTE — H&P
Office Visit    12/5/2017  [facility]  · Jarrod Juan MD   Cardiology  · Coronary artery disease involving native coronary artery of native heart without angina pectoris +2 more   Dx  · Hypertension, Coronary Artery Disease; Referred by Self Referring   Reason for Visit    Progress Notes   Expand All Collapse All         Subjective:      Encounter Date:12/05/2017        Subjective    Patient ID: Houston Armendariz is a 72 y.o. male.     Chief Complaint:  Hypertension   This is a chronic problem. Associated symptoms include malaise/fatigue and shortness of breath. Pertinent negatives include no chest pain or palpitations.   Coronary Artery Disease   Presents for follow-up visit. Symptoms include chest pressure, muscle weakness, shortness of breath and weight gain. Pertinent negatives include no chest pain, chest tightness, dizziness, leg swelling or palpitations. His past medical history is significant for past myocardial infarction. The symptoms have been worsening. Compliance with diet is good. Compliance with exercise is poor. Compliance with medications is good.         72-year-old gentleman with history of coronary artery disease, hypertension, sick sinus syndrome status post pacemaker presents today for reevaluation.  He still gets a little short of breath when walking stairs quickly.  Occasional edema in his cast little bit of fatigue.  All in all he says he is doing relatively well with really no specific complaints that has changed in the past year.     Review of Systems   Constitution: Positive for malaise/fatigue and weight gain.   Cardiovascular: Negative for chest pain, leg swelling and palpitations.   Respiratory: Positive for shortness of breath. Negative for chest tightness.    Endocrine: Positive for cold intolerance.   Skin: Positive for itching.   Musculoskeletal: Positive for muscle weakness.   Neurological: Negative for dizziness.    Psychiatric/Behavioral: Positive for depression.   All other systems reviewed and are negative.        Procedures        Objective:      Objective    Physical Exam   Constitutional: He is oriented to person, place, and time. He appears well-developed.   HENT:   Head: Normocephalic.   Eyes: Conjunctivae are normal.   Neck: Normal range of motion.   Cardiovascular: Normal rate, regular rhythm and normal heart sounds.    Pulmonary/Chest: Breath sounds normal.   Abdominal: Soft. Bowel sounds are normal.   Musculoskeletal: Normal range of motion. He exhibits no edema.   Neurological: He is alert and oriented to person, place, and time.   Skin: Skin is warm and dry.   Psychiatric: He has a normal mood and affect. His behavior is normal.   Vitals reviewed.        Lab Review:         Assessment:      Assessment           Diagnosis Plan   1. Coronary artery disease involving native coronary artery of native heart without angina pectoris      2. SSS (sick sinus syndrome)      3. Essential hypertension               Plan:      Plan      1.  Coronary artery disease stable  2.  Hypertension blood pressures a little bit elevated today.  Told followed closely he says this is unusual.  3.  Pacemaker was interrogated and no arrhythmias.  He is at about 11 months before replacement pacemaker department is following.  4.  Continue same follow-up 6-12 months     Coronary Artery Disease  Plan  • Lifestyle modifications discussed include adhering to a heart healthy diet, avoidance of tobacco products, maintenance of a healthy weight, medication compliance, regular exercise and regular monitoring of cholesterol and blood pressure   Subjective - Objective  • There is a history of past MI  • There is a history of previous coronary artery bypass graft  • Current antiplatelet therapy includes aspirin 81 mg  •               H&P updated. The patient was examined and Gen dominguez

## 2018-05-07 NOTE — DISCHARGE INSTRUCTIONS
Moderate Conscious Sedation, Adult, Care After  Refer to this sheet in the next few weeks. These instructions provide you with information on caring for yourself after your procedure. Your health care provider may also give you more specific instructions. Your treatment has been planned according to current medical practices, but problems sometimes occur. Call your health care provider if you have any problems or questions after your procedure.  WHAT TO EXPECT AFTER THE PROCEDURE   After your procedure:  · You may feel sleepy, clumsy, and have poor balance for several hours.  · Vomiting may occur if you eat too soon after the procedure.  HOME CARE INSTRUCTIONS  · Do not participate in any activities where you could become injured for at least 24 hours. Do not:    Drive.    Swim.    Ride a bicycle.    Operate heavy machinery.    Cook.    Use power tools.    Climb ladders.    Work from a high place.  · Do not make important decisions or sign legal documents until you are improved.  · If you vomit, drink water, juice, or soup when you can drink without vomiting. Make sure you have little or no nausea before eating solid foods.  · Only take over-the-counter or prescription medicines for pain, discomfort, or fever as directed by your health care provider.  · Make sure you and your family fully understand everything about the medicines given to you, including what side effects may occur.  · You should not drink alcohol, take sleeping pills, or take medicines that cause drowsiness for at least 24 hours.  · If you smoke, do not smoke without supervision.  · If you are feeling better, you may resume normal activities 24 hours after you were sedated.  · Keep all appointments with your health care provider.  · You should have a responsible adult stay with you for the first 24 hours post procedure.  SEEK MEDICAL CARE IF:  · Your skin is pale or bluish in color.  · You continue to feel nauseous or vomit.  · Your pain is getting  "worse and is not helped by medicine.  · You have bleeding or swelling.  · You are still sleepy or feeling clumsy after 24 hours.  SEEK IMMEDIATE MEDICAL CARE IF:  · You develop a rash.  · You have difficulty breathing.  · You develop any type of allergic problem.  · You have a fever.  MAKE SURE YOU:  · Understand these instructions.  · Will watch your condition.  · Will get help right away if you are not doing well or get worse.         [facility]   [phone]    Post Pacemaker / Defibrillator Implant Instructions      1.  The dressing may be removed the next day.    2. If steri-strips were used, they should not be removed. Allow them to \"fall off\".      3. You may shower after the dressing is removed. Do not allow shower water to hit directly on incision.    4. No lotion/powder/ointment/cream on incision until it is healed.    5. Gently wash incision daily with soap and water and pat dry.    6. You may reapply a dressing if there is drainage, otherwise leave your incision open to air. If you reapply a dressing, please notify the pacemaker clinic.    7. No heavy lifting, pulling, or pushing.    8. Do not raise the affected arm over your head for a minimum of 1 week.    9. The pacemaker clinic will contact you (usually within 1 business day) to schedule a pacemaker/incision check. The check is usually done 7-10 days post-implant. If you have not heard from the pacemaker clinic within 3 days, please call the office.    10. Please call the office if you experience any of the following:   bleeding or drainage from your incision   swelling, redness, or opening of your incision   fever or chills   pain not relieved with medication   chest pain or difficulty breathing   lightheadness    11. For defibrillator patients only: If you receive a shock from your device, please call the office. If you receive 2 or more shocks within a 24 hour period OR if you receive 1 shock and feel poorly, you should be " evaluated in the emergency room. Please DO NOT DRIVE if you have received a shock until your device has been checked.

## 2018-05-25 ENCOUNTER — CLINICAL SUPPORT NO REQUIREMENTS (OUTPATIENT)
Dept: CARDIOLOGY | Facility: CLINIC | Age: 73
End: 2018-05-25

## 2018-05-25 DIAGNOSIS — I49.5 SICK SINUS SYNDROME (HCC): Primary | ICD-10-CM

## 2018-05-25 PROCEDURE — 93280 PM DEVICE PROGR EVAL DUAL: CPT | Performed by: INTERNAL MEDICINE

## 2018-06-15 ENCOUNTER — CLINICAL SUPPORT NO REQUIREMENTS (OUTPATIENT)
Dept: CARDIOLOGY | Facility: CLINIC | Age: 73
End: 2018-06-15

## 2018-06-15 DIAGNOSIS — I49.5 SICK SINUS SYNDROME (HCC): Primary | ICD-10-CM

## 2018-07-02 ENCOUNTER — CLINICAL SUPPORT NO REQUIREMENTS (OUTPATIENT)
Dept: CARDIOLOGY | Facility: CLINIC | Age: 73
End: 2018-07-02

## 2018-07-02 DIAGNOSIS — I49.5 SICK SINUS SYNDROME (HCC): Primary | ICD-10-CM

## 2018-07-25 RX ORDER — METOPROLOL TARTRATE 50 MG/1
TABLET, FILM COATED ORAL
Qty: 60 TABLET | Refills: 4 | OUTPATIENT
Start: 2018-07-25

## 2018-09-25 ENCOUNTER — CLINICAL SUPPORT NO REQUIREMENTS (OUTPATIENT)
Dept: CARDIOLOGY | Facility: CLINIC | Age: 73
End: 2018-09-25

## 2018-09-25 DIAGNOSIS — I49.5 SSS (SICK SINUS SYNDROME) (HCC): Primary | ICD-10-CM

## 2018-09-25 PROCEDURE — 93296 REM INTERROG EVL PM/IDS: CPT | Performed by: INTERNAL MEDICINE

## 2018-09-25 PROCEDURE — 93294 REM INTERROG EVL PM/LDLS PM: CPT | Performed by: INTERNAL MEDICINE

## 2019-01-03 ENCOUNTER — CLINICAL SUPPORT NO REQUIREMENTS (OUTPATIENT)
Dept: CARDIOLOGY | Facility: CLINIC | Age: 74
End: 2019-01-03

## 2019-01-03 DIAGNOSIS — I49.5 SICK SINUS SYNDROME (HCC): Primary | ICD-10-CM

## 2019-01-03 PROCEDURE — 93280 PM DEVICE PROGR EVAL DUAL: CPT | Performed by: INTERNAL MEDICINE

## 2019-04-05 ENCOUNTER — CLINICAL SUPPORT NO REQUIREMENTS (OUTPATIENT)
Dept: CARDIOLOGY | Facility: CLINIC | Age: 74
End: 2019-04-05

## 2019-04-05 DIAGNOSIS — I49.5 SICK SINUS SYNDROME (HCC): Primary | ICD-10-CM

## 2019-04-05 PROCEDURE — 93294 REM INTERROG EVL PM/LDLS PM: CPT | Performed by: INTERNAL MEDICINE

## 2019-04-05 PROCEDURE — 93296 REM INTERROG EVL PM/IDS: CPT | Performed by: INTERNAL MEDICINE

## 2019-04-16 ENCOUNTER — TELEPHONE (OUTPATIENT)
Dept: CARDIOLOGY | Facility: CLINIC | Age: 74
End: 2019-04-16

## 2019-04-16 ENCOUNTER — CLINICAL SUPPORT NO REQUIREMENTS (OUTPATIENT)
Dept: CARDIOLOGY | Facility: CLINIC | Age: 74
End: 2019-04-16

## 2019-04-16 DIAGNOSIS — I49.5 SSS (SICK SINUS SYNDROME) (HCC): Primary | ICD-10-CM

## 2019-04-16 NOTE — TELEPHONE ENCOUNTER
FYI    Pt hx SSS with AAI=DDD pacemaker DOI 5/2018. Newly dx with Squamous cell Lung CA and started Radiation rxs 4/15 with Dr Valladares at [facility].     Unsched remote received and reviewed 4/16. Normal AAI=DDD pacer function, parameters wnl. Presents AP/VS @60bpm. Apace 54.6%, Vpace <0.1%. No events recorded.     I spoke with pt. He had been instructed by radiation staff to send a transmission daily after each Radiation rx. This is not typical. Per [facility] he only needs to send pre radiation remote and again at the end of his treatment plan. Use of a magnet is recommended in Dependent pts due to the poss of oversensing AMIRA. Pt is not dependent by hx. Underlying Sinus 40s at [facility].  I spoke with Loni at Dr Valladares's office. Rosaura reports magnet was placed on pt during his therapy Monday and they believed this caused him to go into VT at which time magnet was removed. Due to this episode, they told pt to send a pacemaker remote daily. No VT episodes recorded on remote. Likely what they saw were pt  beats at increased magnet rate of 85bpm. Per Rosaura, pt HR returned to pre Rx rate of 60s after magnet was removed as is normal magnet behavior. Per pt he had no symptoms during radiation therapy.     Current plan, They will use magnet during radiation to prevent AMIRA interferrence. Pt will send remote 4/23 as prev sched, after therapy completed 4/22. ( as per MDT recs) Radiation staff or pt will call [facility] Pacer clinic if there are any problems so that addit remote may be sent if warranted. This was communicated to pt as well.   NS

## 2019-04-22 ENCOUNTER — CLINICAL SUPPORT NO REQUIREMENTS (OUTPATIENT)
Dept: CARDIOLOGY | Facility: CLINIC | Age: 74
End: 2019-04-22

## 2019-04-22 DIAGNOSIS — I49.5 SICK SINUS SYNDROME (HCC): Primary | ICD-10-CM

## 2019-07-29 ENCOUNTER — OFFICE VISIT (OUTPATIENT)
Dept: GASTROENTEROLOGY | Facility: CLINIC | Age: 74
End: 2019-07-29

## 2019-07-29 VITALS
WEIGHT: 191.8 LBS | DIASTOLIC BLOOD PRESSURE: 70 MMHG | SYSTOLIC BLOOD PRESSURE: 124 MMHG | TEMPERATURE: 98.5 F | HEIGHT: 70 IN | BODY MASS INDEX: 27.46 KG/M2

## 2019-07-29 DIAGNOSIS — D50.8 OTHER IRON DEFICIENCY ANEMIA: ICD-10-CM

## 2019-07-29 DIAGNOSIS — D12.2 ADENOMATOUS POLYP OF ASCENDING COLON: ICD-10-CM

## 2019-07-29 DIAGNOSIS — R12 HEARTBURN: Primary | ICD-10-CM

## 2019-07-29 PROCEDURE — 99213 OFFICE O/P EST LOW 20 MIN: CPT | Performed by: INTERNAL MEDICINE

## 2019-07-29 NOTE — PROGRESS NOTES
Chief Complaint   Patient presents with   • Follow-up   • Heartburn       Houston Armendariz is a  73 y.o. male here for a follow up visit for heartburn, now with cough    73-year-old gentleman with heartburn and cough both recently worse.  No vomiting.  No weight loss.  Upper GI series shows [place] reflux and spasticity versus narrowing of the esophagus    Currently on Protonix once a day    Last EGD 2 years ago with small hiatal hernia and a large benign polyp I removed which was causing anemia        Past Medical History:   Diagnosis Date   • Allergic rhinitis    • Anemia    • Anxiety    • Artery stenosis (CMS/HCC) 03/15/2011    INFERIOR MESENTERIC   • Artery stenosis (CMS/HCC) 03/15/2011    SUPERIOR MESENTERIC    • Arthritis    • Benign polyp of large intestine    • BPH (benign prostatic hypertrophy)     Dr. Peter.   • Bronchitis 10/30/2013   • CAD (coronary artery disease)     prior MI, status post 3V CABG 2008   • Carotid artery stenosis     Hx of left CEA in 2007   • Cervical stenosis of spinal canal     DDD and OA   • CKD (chronic kidney disease) stage 3, GFR 30-59 ml/min (CMS/[facility])    • Colon polyp    • COPD (chronic obstructive pulmonary disease) (CMS/[facility])    • CVA (cerebral infarction) 01/15/2014    OLD LEFT SUPERIOR PARIETAL INFARCT PER CT OF BRAIN   • Dementia    • Depression    • Diabetic peripheral neuropathy (CMS/HCC)    • Dizziness    • Fainting    • Fatigue    • Gastritis    • GERD (gastroesophageal reflux disease)    • Headache    • Hearing loss     left   • Hematest positive stools 03/15/2012    NEGATIVE X 3   • History of esophagogastroduodenoscopy 06/29/2011   • Hx of Doppler ultrasound 03/15/2011    ARTERY CAROTID 3-2011 <50%stenosis brielle hx of left cea.....09-11 <50%STENOSIS ON RIGHT, NORMAL APPEARINT LEFT S/P ENDARTERECTOMY   • Hyperlipidemia    • Hypertension    • Lumbar canal stenosis     due to DDD and OA   • Muscular aches    • Myocardial infarction (CMS/HCC)    • Nausea 10/30/2013   •  Nocturia    • Parkinson's disease (CMS/HCC)    • Peptic ulcer    • Peripheral arterial disease (CMS/HCC)    • Pulmonary nodule      CT with RUL (new) and smaller RLL nodules (followed by Dr Reynoso), repeat CT 6 months                                     4mm RLL pulmonary nodule per CT, recheck 6 months   • RLS (restless legs syndrome)    • Shortness of breath    • Skin cancer     skin cancer   • Sleep apnea     on BIPAP   • SSS (sick sinus syndrome) (CMS/HCC)    • Stroke (CMS/HCC) 2017   • Type 2 diabetes mellitus (CMS/HCC)    • Urinary hesitancy    • Vitamin D deficiency        Past Surgical History:   Procedure Laterality Date   • BACK SURGERY      ,  C-SPINE, L-SPINE   • CARDIAC ELECTROPHYSIOLOGY PROCEDURE N/A 2018    Procedure: PPM generator change - dual   [employer];  Surgeon: Roverto Foss MD;  Location: [facility] INVASIVE LOCATION;  Service: Cardiovascular   • CATARACT EXTRACTION, BILATERAL  2009    WITH IOL   • CHOLECYSTECTOMY      2011 DYSKINESIA   • COLONOSCOPY N/A 2018    NBIH, TA polyps   • CORONARY ARTERY BYPASS GRAFT     • ENDOSCOPY N/A 2018    acute gastritis, single gastric polyp   • PACEMAKER IMPLANTATION      PERMANENT   • PILONIDAL CYST DRAINAGE     • TEMPORAL ARTERY BIOPSY     • THROMBOENDARTERECTOMY  2008    CAROTID   • VEIN LIGATION AND STRIPPING     • VITRECTOMY PARS PLANA  09/15/2013    LOCAL BLOCK BY SURGEON LEFT       Scheduled Meds:    Continuous Infusions:  No current facility-administered medications for this visit.     PRN Meds:.    No Known Allergies    Social History     Socioeconomic History   • Marital status:      Spouse name: Not on file   • Number of children: Not on file   • Years of education: Not on file   • Highest education level: Not on file   Tobacco Use   • Smoking status: Former Smoker     Packs/day: 1.00     Types: Cigarettes     Last attempt to quit: 2017     Years since quittin.1   • Smokeless  tobacco: Never Used   • Tobacco comment: Quit when he had a stroke in June 2017   Substance and Sexual Activity   • Alcohol use: No     Comment: caffeine use   • Drug use: No   • Sexual activity: Defer       Family History   Problem Relation Age of Onset   • Alzheimer's disease Mother    • Diabetes Mother    • Hypertension Mother    • Hypertension Father    • Heart attack Father    • Heart disease Father    • Hypertension Sister    • Heart disease Sister    • Diabetes Brother    • Heart disease Brother    • Hypertension Brother    • Stroke Brother        Review of Systems   Gastrointestinal: Negative for abdominal distention, abdominal pain, blood in stool, constipation, diarrhea and nausea.   All other systems reviewed and are negative.      Vitals:    07/29/19 1340   BP: 124/70   Temp: 98.5 °F (36.9 °C)       Physical Exam   Constitutional: He is oriented to person, place, and time. He appears well-developed and well-nourished.   HENT:   Head: Normocephalic and atraumatic.   Eyes: Conjunctivae and EOM are normal.   Neck: Normal range of motion. No tracheal deviation present.   Cardiovascular: Normal rate and regular rhythm.   Pulmonary/Chest: Effort normal and breath sounds normal. No respiratory distress.   Abdominal: Soft. Bowel sounds are normal. He exhibits no distension and no mass. There is no tenderness. There is no rebound and no guarding.   Musculoskeletal: Normal range of motion.   Neurological: He is alert and oriented to person, place, and time.   Skin: Skin is warm and dry.   Psychiatric: He has a normal mood and affect. Judgment normal.   Nursing note and vitals reviewed.      No images are attached to the encounter.    Problem list    Worsening cough and GERD on Protonix once a day  Abnormal imaging/barium swallow/upper GI series  Gastric polyp removed 2 years ago  Colon polyps removed 2 years ago      Assessment/Plan    I am going to overbook for an EGD this week, I will likely go to twice daily  PPI at that time

## 2019-07-30 ENCOUNTER — CLINICAL SUPPORT NO REQUIREMENTS (OUTPATIENT)
Dept: CARDIOLOGY | Facility: CLINIC | Age: 74
End: 2019-07-30

## 2019-07-30 ENCOUNTER — TELEPHONE (OUTPATIENT)
Dept: CARDIOLOGY | Facility: CLINIC | Age: 74
End: 2019-07-30

## 2019-07-30 DIAGNOSIS — I49.5 SICK SINUS SYNDROME (HCC): Primary | ICD-10-CM

## 2019-07-30 PROCEDURE — 93280 PM DEVICE PROGR EVAL DUAL: CPT | Performed by: INTERNAL MEDICINE

## 2019-07-30 NOTE — TELEPHONE ENCOUNTER
In office check on dual chamber pm. RV impedance measures 1501 ohms today, previously patient has always measured in the 400's. RV threshold also measured high today at 2.25V @ 0.4ms, bipolar. Previous remotes and at last clinic check in 1/2019, patient has measured 1.125V @ 0.4ms. Reviewed graphs, patient has been trending up since about 6/2019. I could not reproduce noise with isometrics or with pocket manipulation. 0 short V-V episodes. I retested RV threshold unipolar, it measured 1.5V @ 0.4ms. All other lead testing stable. No permanent changes made. Patient presents sinus 79 bpm, =<0.1%.

## 2019-07-31 NOTE — TELEPHONE ENCOUNTER
I called and spoke with patient's wife. Scheduled 6 week check on 9/10/19 in conjunction with Dr. Juan office appt.

## 2019-08-07 ENCOUNTER — OUTSIDE FACILITY SERVICE (OUTPATIENT)
Dept: GASTROENTEROLOGY | Facility: CLINIC | Age: 74
End: 2019-08-07

## 2019-08-07 ENCOUNTER — TELEPHONE (OUTPATIENT)
Dept: GASTROENTEROLOGY | Facility: CLINIC | Age: 74
End: 2019-08-07

## 2019-08-07 PROCEDURE — 43239 EGD BIOPSY SINGLE/MULTIPLE: CPT | Performed by: INTERNAL MEDICINE

## 2019-08-07 RX ORDER — PANTOPRAZOLE SODIUM 40 MG/1
40 TABLET, DELAYED RELEASE ORAL 2 TIMES DAILY
Qty: 60 TABLET | Refills: 12 | Status: SHIPPED | OUTPATIENT
Start: 2019-08-07

## 2019-08-07 NOTE — TELEPHONE ENCOUNTER
----- Message from Amauri Burden MD sent at 8/7/2019 12:07 PM EDT -----  Regarding: go to bid PPI  Call in protonix 40mg po bid #[phone] rf  thx

## 2019-08-15 ENCOUNTER — TELEPHONE (OUTPATIENT)
Dept: GASTROENTEROLOGY | Facility: CLINIC | Age: 74
End: 2019-08-15

## 2019-08-15 NOTE — TELEPHONE ENCOUNTER
"Patient called, was placed on speaker phone so spouse could hear results. Advised as per Dr. Burden's note. Spouse states she is not happy with the results as patient continues with his \"horrible\" cough. Offered f/u visit with NP for either tomorrow or next week. She declined visit and states she is going to take the patient back \"[place] to see Dr. Xiao\".  Update sent to Dr. Burden.   "

## 2019-08-15 NOTE — TELEPHONE ENCOUNTER
----- Message from Amauri Burden MD sent at 8/13/2019  5:46 PM EDT -----  Pathology benign  Continue twice daily PPI  Office visit Laura 4 to 6 weeks

## 2019-09-10 ENCOUNTER — TELEPHONE (OUTPATIENT)
Dept: CARDIOLOGY | Facility: CLINIC | Age: 74
End: 2019-09-10

## 2019-09-10 ENCOUNTER — OFFICE VISIT (OUTPATIENT)
Dept: CARDIOLOGY | Facility: CLINIC | Age: 74
End: 2019-09-10

## 2019-09-10 ENCOUNTER — CLINICAL SUPPORT NO REQUIREMENTS (OUTPATIENT)
Dept: CARDIOLOGY | Facility: CLINIC | Age: 74
End: 2019-09-10

## 2019-09-10 VITALS
WEIGHT: 189.6 LBS | HEART RATE: 73 BPM | BODY MASS INDEX: 27.14 KG/M2 | SYSTOLIC BLOOD PRESSURE: 130 MMHG | OXYGEN SATURATION: 98 % | HEIGHT: 70 IN | DIASTOLIC BLOOD PRESSURE: 76 MMHG

## 2019-09-10 DIAGNOSIS — I49.5 SICK SINUS SYNDROME (HCC): Primary | ICD-10-CM

## 2019-09-10 DIAGNOSIS — I25.10 CORONARY ARTERY DISEASE INVOLVING NATIVE CORONARY ARTERY OF NATIVE HEART WITHOUT ANGINA PECTORIS: Primary | ICD-10-CM

## 2019-09-10 DIAGNOSIS — I10 ESSENTIAL HYPERTENSION: ICD-10-CM

## 2019-09-10 DIAGNOSIS — I49.5 SSS (SICK SINUS SYNDROME) (HCC): ICD-10-CM

## 2019-09-10 PROCEDURE — 99214 OFFICE O/P EST MOD 30 MIN: CPT | Performed by: INTERNAL MEDICINE

## 2019-09-10 PROCEDURE — 93280 PM DEVICE PROGR EVAL DUAL: CPT | Performed by: INTERNAL MEDICINE

## 2019-09-10 NOTE — TELEPHONE ENCOUNTER
Patient is here today to test his rv lead again. His threshold is 3.50 v @ 1 ms ,lead imp. Is 2698 ohms . I did testing unipolar and it was .75 v @ .40 ms ,lead imp is 399 ohms . Patient is only .2 % vent. Paced . I did not change to unipolar perm.  He is set at 6 v at 1 ms in the vent. I would like to get you advise. Patient is to come back for a 3 month check in Dec.  Thanks Leslie

## 2019-09-12 NOTE — PROGRESS NOTES
Subjective:     Encounter Date:09/10/19      Patient ID: Houston Armendariz is a 73 y.o. male.    Chief Complaint:  Coronary Artery Disease   Presents for follow-up visit. Symptoms include chest pressure, muscle weakness, shortness of breath and weight gain. Pertinent negatives include no chest pain, chest tightness, dizziness, leg swelling or palpitations. The symptoms have been worsening. Compliance with diet is good. Compliance with exercise is poor. Compliance with medications is good.   Hypertension   This is a chronic problem. Associated symptoms include malaise/fatigue and shortness of breath. Pertinent negatives include no chest pain or palpitations.   Sick sinus syndrome    73-year-old gentleman who presents today for reevaluation.  Clinically he continues to do well no chest pain shortness of breath palpitations lightheadedness.  He will occasionally get some edema in his feet occasionally gets fatigued.  Patient had his pacemaker interrogated.  Patient did have an increase in the threshold of his ventricular lead and is currently being followed for this.  They are going to recheck his pacemaker in 3 months.    Review of Systems   Constitution: Positive for malaise/fatigue and weight gain.   Cardiovascular: Negative for chest pain, leg swelling and palpitations.   Respiratory: Positive for shortness of breath. Negative for chest tightness.    Endocrine: Positive for cold intolerance.   Skin: Positive for itching.   Musculoskeletal: Positive for muscle weakness.   Neurological: Negative for dizziness.   Psychiatric/Behavioral: Positive for depression.   All other systems reviewed and are negative.      Procedures         Objective:     Physical Exam   Constitutional: He is oriented to person, place, and time. He appears well-developed.   HENT:   Head: Normocephalic.   Eyes: Conjunctivae are normal.   Neck: Normal range of motion.   Cardiovascular: Normal rate, regular rhythm and normal heart sounds.    Pulmonary/Chest: Breath sounds normal.   Abdominal: Soft. Bowel sounds are normal.   Musculoskeletal: Normal range of motion. He exhibits no edema.   Neurological: He is alert and oriented to person, place, and time.   Skin: Skin is warm and dry.   Psychiatric: He has a normal mood and affect. His behavior is normal.   Vitals reviewed.      Lab Review:       Assessment:          Diagnosis Plan   1. Coronary artery disease involving native coronary artery of native heart without angina pectoris     2. SSS (sick sinus syndrome) (CMS/HCC)     3. Essential hypertension            Plan:       1.  Coronary artery disease stable  2.  Hypertension blood pressures good today  3.  Pacemaker was interrogated and no arrhythmias.  She did have an increase in threshold of his RV lead.  Dr. Foss was made aware by the pacemaker department and this is going to be followed.  4.  Continue same follow-up 6-12 months    Coronary Artery Disease  Plan  • Lifestyle modifications discussed include adhering to a heart healthy diet, avoidance of tobacco products, maintenance of a healthy weight, medication compliance, regular exercise and regular monitoring of cholesterol and blood pressure    Subjective - Objective  • There is a history of previous coronary artery bypass graft  • Current antiplatelet therapy includes aspirin 81 mg  •

## 2019-10-22 ENCOUNTER — TELEPHONE (OUTPATIENT)
Dept: CARDIOLOGY | Facility: CLINIC | Age: 74
End: 2019-10-22

## 2019-10-22 NOTE — TELEPHONE ENCOUNTER
Elise called from [facility] informing Dr. Juan that Mr. Armendariz will be starting radiation treatment on 10/29/19 and will be finished on 11/04/19. She stated that he would need a device check before treatment and after. I explained to her that he does have an upcoming apt for a device check 11/01/19. She would like for us to fax over the results whenever he comes in to get that checked.    Elise can be reached at [phone]

## 2019-11-05 ENCOUNTER — TELEPHONE (OUTPATIENT)
Dept: CARDIOLOGY | Facility: CLINIC | Age: 74
End: 2019-11-05

## 2019-11-05 ENCOUNTER — CLINICAL SUPPORT NO REQUIREMENTS (OUTPATIENT)
Dept: CARDIOLOGY | Facility: CLINIC | Age: 74
End: 2019-11-05

## 2019-11-05 DIAGNOSIS — I49.5 SICK SINUS SYNDROME (HCC): Primary | ICD-10-CM

## 2019-11-05 PROCEDURE — 93280 PM DEVICE PROGR EVAL DUAL: CPT | Performed by: INTERNAL MEDICINE

## 2019-11-05 NOTE — TELEPHONE ENCOUNTER
YAMILKA in Dr. Foss's absence.  We will adalgisa. Pt. In one month.    Dual chamber pm check, post completion of Radiation treatments for lung Cancer on the left side, below pacemaker.  Presenting egm, sinus, AS/VS @ 68bpm.  AP=8%  =0%  Polarity switch on RV lead to Unipolar on 10/17/19.  Signs of RV lead failure, Bipolar since June, 2019.  Todays testing, Unipolar, R wave=10.3mv, 0.75v @ 1.0ms, 1.0v @ .40ms, Lead Impedance=380ohms.  Bipolar, R wave=11.5mv, 4.75v @ 1.0ms, Lead Impedance>3000ohms.  There have been no oversensing episodes and pt. did not feel any stimulation during threshold testing, programmed Unipolar.  Therefore, I kept the RV lead programmed Unipolar, sense and pace.  I turned RV auto capture on and output 3v @ .40ms.  Remote scheduled on 12/19/19 to adalgisa. RV lead.  Thanks. Ama

## 2019-11-06 NOTE — TELEPHONE ENCOUNTER
OK Ama. Let me know if you need me to do anything.    Dr. Leny PRATHER message from pacemaker department below.

## 2019-12-19 ENCOUNTER — CLINICAL SUPPORT NO REQUIREMENTS (OUTPATIENT)
Dept: CARDIOLOGY | Facility: CLINIC | Age: 74
End: 2019-12-19

## 2019-12-19 DIAGNOSIS — I49.5 SICK SINUS SYNDROME (HCC): Primary | ICD-10-CM

## 2019-12-19 PROCEDURE — 93294 REM INTERROG EVL PM/LDLS PM: CPT | Performed by: INTERNAL MEDICINE

## 2019-12-19 PROCEDURE — 93296 REM INTERROG EVL PM/IDS: CPT | Performed by: INTERNAL MEDICINE

## 2020-01-31 ENCOUNTER — TELEPHONE (OUTPATIENT)
Dept: CARDIOLOGY | Facility: CLINIC | Age: 75
End: 2020-01-31

## 2020-01-31 ENCOUNTER — CLINICAL SUPPORT NO REQUIREMENTS (OUTPATIENT)
Dept: CARDIOLOGY | Facility: CLINIC | Age: 75
End: 2020-01-31

## 2020-01-31 DIAGNOSIS — I49.5 SICK SINUS SYNDROME (HCC): Primary | ICD-10-CM

## 2020-01-31 PROCEDURE — 93280 PM DEVICE PROGR EVAL DUAL: CPT | Performed by: INTERNAL MEDICINE

## 2020-02-06 ENCOUNTER — TELEPHONE (OUTPATIENT)
Dept: CARDIOLOGY | Facility: CLINIC | Age: 75
End: 2020-02-06

## 2020-02-06 NOTE — TELEPHONE ENCOUNTER
Pt's wife left msg saying her  needs to get a pain injection for a headache he's had for 3 months and needs to hold his Plavix prior.  Please advise.    [phone] Home    [phone] Cell--> Call this if after 1 PM    Thanks,  Tonya

## 2020-08-19 RX ORDER — PANTOPRAZOLE SODIUM 40 MG/1
TABLET, DELAYED RELEASE ORAL
Qty: 60 TABLET | Refills: 11 | OUTPATIENT
Start: 2020-08-19

## 2020-08-28 ENCOUNTER — TELEPHONE (OUTPATIENT)
Dept: CARDIOLOGY | Facility: CLINIC | Age: 75
End: 2020-08-28

## 2020-10-14 RX ORDER — PANTOPRAZOLE SODIUM 40 MG/1
TABLET, DELAYED RELEASE ORAL
Qty: 60 TABLET | Refills: 11 | OUTPATIENT
Start: 2020-10-14

## 2020-10-29 ENCOUNTER — OFFICE VISIT (OUTPATIENT)
Dept: CARDIOLOGY | Facility: CLINIC | Age: 75
End: 2020-10-29

## 2020-10-29 VITALS
HEART RATE: 76 BPM | WEIGHT: 174 LBS | BODY MASS INDEX: 24.91 KG/M2 | DIASTOLIC BLOOD PRESSURE: 70 MMHG | HEIGHT: 70 IN | SYSTOLIC BLOOD PRESSURE: 120 MMHG

## 2020-10-29 DIAGNOSIS — I10 ESSENTIAL HYPERTENSION: ICD-10-CM

## 2020-10-29 DIAGNOSIS — I25.10 CORONARY ARTERY DISEASE INVOLVING NATIVE CORONARY ARTERY OF NATIVE HEART WITHOUT ANGINA PECTORIS: Primary | ICD-10-CM

## 2020-10-29 DIAGNOSIS — I49.5 SSS (SICK SINUS SYNDROME) (HCC): ICD-10-CM

## 2020-10-29 PROCEDURE — 99214 OFFICE O/P EST MOD 30 MIN: CPT | Performed by: INTERNAL MEDICINE

## 2020-10-29 PROCEDURE — 93000 ELECTROCARDIOGRAM COMPLETE: CPT | Performed by: INTERNAL MEDICINE

## 2020-10-29 RX ORDER — BUDESONIDE 0.25 MG/2ML
0.25 INHALANT ORAL
COMMUNITY

## 2020-10-29 RX ORDER — HYDROCODONE BITARTRATE AND ACETAMINOPHEN 10; 325 MG/1; MG/1
1 TABLET ORAL EVERY 6 HOURS PRN
COMMUNITY

## 2020-10-29 RX ORDER — CETIRIZINE HYDROCHLORIDE 10 MG/1
5 TABLET ORAL DAILY
COMMUNITY

## 2020-10-29 RX ORDER — BACLOFEN 20 MG/1
10 TABLET ORAL 2 TIMES DAILY
COMMUNITY

## 2020-10-29 RX ORDER — ROSUVASTATIN CALCIUM 10 MG/1
10 TABLET, COATED ORAL DAILY
COMMUNITY

## 2020-10-29 NOTE — PROGRESS NOTES
Subjective:     Encounter Date:09/10/19      Patient ID: Houston Armendariz is a 75 y.o. male.    Chief Complaint:  Coronary Artery Disease  Presents for follow-up visit. Symptoms include chest pressure, muscle weakness, shortness of breath and weight gain. Pertinent negatives include no chest pain, chest tightness, dizziness, leg swelling or palpitations. The symptoms have been worsening. Compliance with diet is good. Compliance with exercise is poor. Compliance with medications is good.   Hypertension  This is a chronic problem. Associated symptoms include malaise/fatigue and shortness of breath. Pertinent negatives include no chest pain or palpitations.   Sick sinus syndrome    75-year-old gentleman who presents today for reevaluation.  Patient had fallen and hit his head.  With emergency room work-up found lung cancer.  He had radiation treatment has been changed to Keytruda.  Patient is doing better from that aspect but still continues to have headaches.  His Parkinson's has also worsened.  He is now either using a walker or he is in a wheelchair there has been progressive deterioration from that aspect.  One positive thing is his memory has been better with his medications.    Review of Systems   Constitution: Positive for malaise/fatigue and weight gain.   Cardiovascular: Negative for chest pain, leg swelling and palpitations.   Respiratory: Positive for shortness of breath. Negative for chest tightness.    Endocrine: Positive for cold intolerance.   Skin: Positive for itching.   Musculoskeletal: Positive for muscle weakness.   Neurological: Negative for dizziness.   Psychiatric/Behavioral: Positive for depression.   All other systems reviewed and are negative.        ECG 12 Lead    Date/Time: 10/29/2020 9:30 AM  Performed by: Jarrod Juan MD  Authorized by: Jarrod Juan MD   Comparison: compared with previous ECG from 9/20/2017  Similar to previous ECG  Rhythm: sinus rhythm    Clinical  impression: abnormal EKG               Objective:     Physical Exam   Constitutional: He is oriented to person, place, and time. He appears well-developed.   HENT:   Head: Normocephalic.   Eyes: Conjunctivae are normal.   Neck: Normal range of motion.   Cardiovascular: Normal rate, regular rhythm and normal heart sounds.   Pulmonary/Chest: Breath sounds normal.   Abdominal: Soft. Bowel sounds are normal.   Musculoskeletal: Normal range of motion.         General: No edema.   Neurological: He is alert and oriented to person, place, and time.   Skin: Skin is warm and dry.   Psychiatric: He has a normal mood and affect. His behavior is normal.   Vitals reviewed.      Lab Review:       Assessment:          Diagnosis Plan   1. Coronary artery disease involving native coronary artery of native heart without angina pectoris     2. SSS (sick sinus syndrome) (CMS/HCC)     3. Essential hypertension            Plan:       1.  Coronary artery disease stable  2.  Hypertension blood pressures good today  3.  Pacemaker   4.  Lung cancer currently on Keytruda tolerating it well.  5.  I did refill his nitroglycerin he does take it from time to time but all in all it has not increased in frequency so we will discontinue that approach.  Would follow-up with him in 1 year sooner if he has an issue.    Coronary Artery Disease  Plan  • Lifestyle modifications discussed include adhering to a heart healthy diet, avoidance of tobacco products, maintenance of a healthy weight, medication compliance, regular exercise and regular monitoring of cholesterol and blood pressure    Subjective - Objective  • There is a history of previous coronary artery bypass graft  • Current antiplatelet therapy includes aspirin 81 mg  •

## 2021-09-21 ENCOUNTER — CLINICAL SUPPORT NO REQUIREMENTS (OUTPATIENT)
Dept: CARDIOLOGY | Facility: CLINIC | Age: 76
End: 2021-09-21

## 2021-09-21 DIAGNOSIS — I49.5 SSS (SICK SINUS SYNDROME) (HCC): Primary | ICD-10-CM

## 2021-09-21 PROCEDURE — 93280 PM DEVICE PROGR EVAL DUAL: CPT | Performed by: INTERNAL MEDICINE

## (undated) DEVICE — Device

## (undated) DEVICE — DRSNG SURESITE WNDW 4X4.5

## (undated) DEVICE — THE TORRENT IRRIGATION SCOPE CONNECTOR IS USED WITH THE TORRENT IRRIGATION TUBING TO PROVIDE IRRIGATION FLUIDS SUCH AS STERILE WATER DURING GASTROINTESTINAL ENDOSCOPIC PROCEDURES WHEN USED IN CONJUNCTION WITH AN IRRIGATION PUMP (OR ELECTROSURGICAL UNIT).: Brand: TORRENT

## (undated) DEVICE — TUBING, SUCTION, 1/4" X 10', STRAIGHT: Brand: MEDLINE

## (undated) DEVICE — CANN NASL CO2 TRULINK W/O2 A/

## (undated) DEVICE — PLASMABLADE PS200-040 4.0: Brand: PLASMABLADE™

## (undated) DEVICE — SNAR POLYP SENSATION STDOVL 27 240 BX40

## (undated) DEVICE — ELECTRD ECG CARBON/SNP RL FM A/ 5PK

## (undated) DEVICE — FRCP BX RADJAW4 NDL 2.8 240CM LG OG BX40

## (undated) DEVICE — PAD GRND REM POLYHESIVE A/ DISP

## (undated) DEVICE — Device: Brand: DEFENDO AIR/WATER/SUCTION AND BIOPSY VALVE

## (undated) DEVICE — BITEBLOCK OMNI BLOC

## (undated) DEVICE — LOU PACE DEFIB: Brand: MEDLINE INDUSTRIES, INC.

## (undated) DEVICE — AIRLIFE™ NASAL OXYGEN CANNULA CURVED, NONFLARED TIP WITH 21 FOOT (6.4 M) CRUSH-RESISTANT TUBING, OVER-THE-EAR STYLE: Brand: AIRLIFE™

## (undated) DEVICE — TBG 02 CRUSH RESIST LF CLR 7FT

## (undated) DEVICE — LIMB HOLDER, WRIST/ANKLE: Brand: DEROYAL

## (undated) DEVICE — THE SINGLE USE ETRAP – POLYP TRAP IS USED FOR SUCTION RETRIEVAL OF ENDOSCOPICALLY REMOVED POLYPS.: Brand: ETRAP